# Patient Record
Sex: FEMALE | Race: WHITE | NOT HISPANIC OR LATINO | Employment: OTHER | URBAN - METROPOLITAN AREA
[De-identification: names, ages, dates, MRNs, and addresses within clinical notes are randomized per-mention and may not be internally consistent; named-entity substitution may affect disease eponyms.]

---

## 2019-06-12 ENCOUNTER — TRANSCRIBE ORDERS (OUTPATIENT)
Dept: ADMINISTRATIVE | Facility: HOSPITAL | Age: 79
End: 2019-06-12

## 2019-06-12 DIAGNOSIS — Z12.39 BREAST SCREENING, UNSPECIFIED: Primary | ICD-10-CM

## 2019-06-14 ENCOUNTER — HOSPITAL ENCOUNTER (OUTPATIENT)
Dept: RADIOLOGY | Age: 79
Discharge: HOME | End: 2019-06-14
Payer: MEDICARE

## 2019-06-14 VITALS — BODY MASS INDEX: 20.14 KG/M2 | HEIGHT: 64 IN | WEIGHT: 118 LBS

## 2019-06-14 DIAGNOSIS — Z12.39 BREAST SCREENING, UNSPECIFIED: ICD-10-CM

## 2019-06-14 PROCEDURE — 77067 SCR MAMMO BI INCL CAD: CPT

## 2019-06-25 ENCOUNTER — HOSPITAL ENCOUNTER (OUTPATIENT)
Dept: RADIOLOGY | Age: 79
Discharge: HOME | End: 2019-06-25

## 2019-06-25 DIAGNOSIS — Z12.39 BREAST CANCER SCREENING: ICD-10-CM

## 2020-07-21 ENCOUNTER — HOSPITAL ENCOUNTER (EMERGENCY)
Facility: HOSPITAL | Age: 80
Discharge: HOME | End: 2020-07-21
Attending: EMERGENCY MEDICINE | Admitting: EMERGENCY MEDICINE
Payer: MEDICARE

## 2020-07-21 VITALS
TEMPERATURE: 98.1 F | RESPIRATION RATE: 16 BRPM | BODY MASS INDEX: 19.22 KG/M2 | HEART RATE: 67 BPM | DIASTOLIC BLOOD PRESSURE: 80 MMHG | OXYGEN SATURATION: 99 % | SYSTOLIC BLOOD PRESSURE: 178 MMHG | WEIGHT: 112 LBS
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
## 2020-07-21 PROCEDURE — 99283 EMERGENCY DEPT VISIT LOW MDM: CPT

## 2020-07-21 PROCEDURE — 90471 IMMUNIZATION ADMIN: CPT

## 2020-07-21 PROCEDURE — 99284 EMERGENCY DEPT VISIT MOD MDM: CPT | Performed by: EMERGENCY MEDICINE

## 2020-07-21 PROCEDURE — 90715 TDAP VACCINE 7 YRS/> IM: CPT | Performed by: EMERGENCY MEDICINE

## 2020-07-21 RX ADMIN — TETANUS TOXOID, REDUCED DIPHTHERIA TOXOID AND ACELLULAR PERTUSSIS VACCINE, ADSORBED 0.5 ML: 5; 2.5; 8; 8; 2.5 SUSPENSION INTRAMUSCULAR at 20:41

## 2020-07-22 NOTE — ED NOTES
Registration called stating that the patient was walking out  Nurse made aware       Montrell Adams RN  07/21/20 5357

## 2020-07-22 NOTE — ED ATTENDING ATTESTATION
7/21/2020  IBhanu MD, saw and evaluated the patient  I have discussed the patient with the resident/non-physician practitioner and agree with the resident's/non-physician practitioner's findings, Plan of Care, and MDM as documented in the resident's/non-physician practitioner's note, except where noted  All available labs and Radiology studies were reviewed  I was present for key portions of any procedure(s) performed by the resident/non-physician practitioner and I was immediately available to provide assistance  At this point I agree with the current assessment done in the Emergency Department  I have conducted an independent evaluation of this patient a history and physical is as follows:   Pt fell approx 2 hours pta tripped over something on floor Pt fell hit face no loc Pt got up on own and ambulated no near syncope or cp prior to event  Pt co pain over nose   PE: alert nad heart reg lungs clear abd soft nontender ext nad neuroo nonfocal  Abrasion to bridge of nose no septal hematoma  tms clear  No neck tenderness heart reg lungs clear abd soft nontender neuro nonfocal  MDM: Will do ct head and facial bones   ED Course         Critical Care Time  Procedures

## 2020-07-22 NOTE — ED PROVIDER NOTES
History  Chief Complaint   Patient presents with    Fall     Pt stated that she tripped while walking and hit her nose on the ceramic floor  Denies any blood thinner or LOC  Laceration noted to nose  Bleeding controlled  Worried that she may over broken her nose  HPI     79-year-old female known no prior past medical history presents for evaluation after a fall which occurred approximately two hours prior to arrival in the ED  Patient states she was at home and tripped over something on the floor and hit her face on a table  Patient denies any loss of consciousness  Patient denies any preceding symptoms such as lightheadedness, chest pain, shortness of breath, headache, or palpitations  Patient denies any recent illnesses  At this time, patient is only having pain over her nose  Patient states she was having some bleeding from her nose prior to arrival but it has stopped since then  Denies any headaches or neck pain  Denies any pain in her extremities, chest or abdomen  Denies any weakness, numbness, tingling in her extremities  Denies any visual disturbances, dysphagia, dysarthria, or difficulty with ambulation  Denies any nausea or vomiting  Denies any other symptoms at this time  Patient does not take any antiplatelets or anticoagulants  None       History reviewed  No pertinent past medical history  History reviewed  No pertinent surgical history      Family History   Problem Relation Age of Onset    No Known Problems Mother     No Known Problems Father     No Known Problems Maternal Grandmother     No Known Problems Maternal Grandfather     No Known Problems Paternal Grandmother     Colon cancer Paternal Grandfather 61    Prostate cancer Brother 67    No Known Problems Maternal Aunt     No Known Problems Paternal Aunt     No Known Problems Paternal Aunt     No Known Problems Paternal Aunt     No Known Problems Paternal Aunt      I have reviewed and agree with the history as documented  E-Cigarette/Vaping    E-Cigarette Use Never User      E-Cigarette/Vaping Substances     Social History     Tobacco Use    Smoking status: Never Smoker    Smokeless tobacco: Never Used   Substance Use Topics    Alcohol use: Yes     Frequency: 2-4 times a month     Drinks per session: 1 or 2     Comment: 1 glass of wine a week    Drug use: Not Currently        Review of Systems   Constitutional: Negative for appetite change, chills, fatigue, fever and unexpected weight change  HENT: Positive for nosebleeds  Negative for congestion, facial swelling, rhinorrhea, sore throat and trouble swallowing  Eyes: Negative for visual disturbance  Respiratory: Negative for cough, chest tightness, shortness of breath and wheezing  Cardiovascular: Negative for chest pain, palpitations and leg swelling  Gastrointestinal: Negative for abdominal distention, abdominal pain, constipation, diarrhea, nausea and vomiting  Genitourinary: Negative for dysuria  Musculoskeletal: Negative for arthralgias, back pain, gait problem, myalgias and neck pain  Skin: Negative for color change and rash  Neurological: Negative for dizziness, syncope, speech difficulty, weakness, light-headedness, numbness and headaches  All other systems reviewed and are negative  Physical Exam  ED Triage Vitals [07/21/20 2003]   Temperature Pulse Respirations Blood Pressure SpO2   98 1 °F (36 7 °C) 67 16 (!) 178/80 99 %      Temp Source Heart Rate Source Patient Position - Orthostatic VS BP Location FiO2 (%)   Oral -- -- -- --      Pain Score       1             Orthostatic Vital Signs  Vitals:    07/21/20 2003   BP: (!) 178/80   Pulse: 67       Physical Exam   Constitutional: She is oriented to person, place, and time  She appears well-developed and well-nourished  No distress  HENT:   Head: Normocephalic  Mouth/Throat: Oropharynx is clear and moist  No oropharyngeal exudate  Small abrasion over the nose   No raccoon eyes  Minimal tenderness over the nasal bone  No tenderness around the orbits, maxilla, mandible or the scalp  No septal hematoma  No hemotympanum  Eyes: Pupils are equal, round, and reactive to light  Conjunctivae and EOM are normal  Right eye exhibits no discharge  Left eye exhibits no discharge  Neck: Normal range of motion  Neck supple  Cardiovascular: Normal rate, regular rhythm, normal heart sounds and intact distal pulses  Exam reveals no gallop and no friction rub  No murmur heard  Pulmonary/Chest: Effort normal and breath sounds normal  No respiratory distress  She has no wheezes  She has no rales  Abdominal: Soft  Bowel sounds are normal  She exhibits no distension  There is no tenderness  There is no rebound and no guarding  Musculoskeletal: She exhibits no edema, tenderness or deformity  No midline neck or back pain  Neurological: She is alert and oriented to person, place, and time  She has normal strength  No cranial nerve deficit or sensory deficit  Coordination and gait normal    Skin: Skin is warm and dry  No rash noted  She is not diaphoretic  No erythema  No bruising or abrasions over her arms or legs  Psychiatric: She has a normal mood and affect  Her behavior is normal    Nursing note and vitals reviewed  ED Medications  Medications   tetanus-diphtheria-acellular pertussis (BOOSTRIX) IM injection 0 5 mL (0 5 mL Intramuscular Given 7/21/20 2041)       Diagnostic Studies  Results Reviewed     None                 No orders to display         Procedures  Procedures      ED Course       US AUDIT      Most Recent Value   Initial Alcohol Screen: US AUDIT-C    1  How often do you have a drink containing alcohol?  0 Filed at: 07/21/2020 2019   2  How many drinks containing alcohol do you have on a typical day you are drinking? 0 Filed at: 07/21/2020 2019   3a  Male UNDER 65: How often do you have five or more drinks on one occasion? 0 Filed at: 07/21/2020 2019   3b   FEMALE Any Age, or MALE 65+: How often do you have 4 or more drinks on one occassion? 0 Filed at: 07/21/2020 2019   Audit-C Score  0 Filed at: 07/21/2020 2019              Identification of Seniors at Risk      Most Recent Value   (ISAR) Identification of Seniors at Risk   Before the illness or injury that brought you to the Emergency, did you need someone to help you on a regular basis? 0 Filed at: 07/21/2020 2006   In the last 24 hours, have you needed more help than usual?  0 Filed at: 07/21/2020 2006   Have you been hospitalized for one or more nights during the past 6 months? 0 Filed at: 07/21/2020 2006   In general, do you see well?  0 Filed at: 07/21/2020 2006   In general, do you have serious problems with your memory? 0 Filed at: 07/21/2020 2006   Do you take more than three different medications every day?  0 Filed at: 07/21/2020 2006   ISAR Score  0 Filed at: 07/21/2020 2006          SOO/DAST-10      Most Recent Value   How many times in the past year have you    Used an illegal drug or used a prescription medication for non-medical reasons? Never Filed at: 07/21/2020 2019                              [facility]    80-year-old otherwise healthy female presents for evaluation after a mechanical fall at home  No LOC  Patient has a small abrasion on her nose with mild tenderness to palpation  Neurologic exam is normal, patient is alert and oriented x3  Differential diagnosis includes: nasal fracture, intracranial bleed, contusion/abrasion to nose  Patient does not recall when her last tetanus shot was, will give her tetanus booster  Will also evaluate with noncontrast CT of the head and CT facial bones  Informed by RN that patient left AMA before workup was completed  RN states she gave patient return precautions prior to leaving              Disposition  Final diagnoses:   Fall, initial encounter     Time reflects when diagnosis was documented in both MDM as applicable and the Disposition within this note Time User Action Codes Description Comment    7/21/2020  9:39 PM Melyssa Duckworth Add [N90  XXXA] Fall, initial encounter       ED Disposition     ED Disposition Condition Date/Time Comment    Left from Room after Provider Exam  Tue Jul 21, 2020  9:09 PM       Follow-up Information    None         There are no discharge medications for this patient  No discharge procedures on file  PDMP Review     None           ED Provider  Attending physically available and evaluated Paco Rodriguezlucie KAUFFMAN managed the patient along with the ED Attending      Electronically Signed by         Milind Ford MD  07/21/20 6031

## 2020-07-22 NOTE — ED NOTES
Pt and  eloped from th department at this time  RN urged pt to stay in the department and await CT scan  RN explained the adverse effects of leaving and urged pt to come in for reevaluation if symptoms would worsen or continue  Pt and family verbalized understanding and remain adamant on leaving  Dr Elise Perdomo notified        Dionisio Jackson RN  07/21/20 9609

## 2020-09-14 ENCOUNTER — OFFICE VISIT (OUTPATIENT)
Dept: INTERNAL MEDICINE CLINIC | Facility: CLINIC | Age: 80
End: 2020-09-14
Payer: MEDICARE

## 2020-09-14 VITALS
HEIGHT: 64 IN | TEMPERATURE: 97.3 F | SYSTOLIC BLOOD PRESSURE: 140 MMHG | DIASTOLIC BLOOD PRESSURE: 82 MMHG | BODY MASS INDEX: 18.95 KG/M2 | HEART RATE: 82 BPM | WEIGHT: 111 LBS

## 2020-09-14 DIAGNOSIS — R03.0 WHITE COAT SYNDROME WITH HIGH BLOOD PRESSURE WITHOUT HYPERTENSION: ICD-10-CM

## 2020-09-14 DIAGNOSIS — H01.013 ULCERATIVE BLEPHARITIS OF BOTH EYES, UNSPECIFIED EYELID: ICD-10-CM

## 2020-09-14 DIAGNOSIS — E53.8 VITAMIN B12 DEFICIENCY: ICD-10-CM

## 2020-09-14 DIAGNOSIS — H01.016 ULCERATIVE BLEPHARITIS OF BOTH EYES, UNSPECIFIED EYELID: ICD-10-CM

## 2020-09-14 DIAGNOSIS — T78.40XD ALLERGIC STATE, SUBSEQUENT ENCOUNTER: ICD-10-CM

## 2020-09-14 DIAGNOSIS — Z23 NEED FOR INFLUENZA VACCINATION: ICD-10-CM

## 2020-09-14 DIAGNOSIS — K21.9 GASTROESOPHAGEAL REFLUX DISEASE WITHOUT ESOPHAGITIS: Primary | ICD-10-CM

## 2020-09-14 DIAGNOSIS — E55.9 VITAMIN D DEFICIENCY: ICD-10-CM

## 2020-09-14 PROBLEM — T78.40XA ALLERGIES: Status: ACTIVE | Noted: 2020-09-14

## 2020-09-14 PROCEDURE — 99214 OFFICE O/P EST MOD 30 MIN: CPT | Performed by: INTERNAL MEDICINE

## 2020-09-14 PROCEDURE — 90674 CCIIV4 VAC NO PRSV 0.5 ML IM: CPT

## 2020-09-14 PROCEDURE — G0008 ADMIN INFLUENZA VIRUS VAC: HCPCS

## 2020-09-14 RX ORDER — MELATONIN
2000 DAILY
COMMUNITY

## 2020-09-14 RX ORDER — FAMOTIDINE 10 MG
10 TABLET ORAL 2 TIMES DAILY PRN
COMMUNITY

## 2020-09-14 NOTE — PROGRESS NOTES
Assessment/Plan:           1  Gastroesophageal reflux disease without esophagitis    2  Vitamin D deficiency    3  Vitamin B12 deficiency    4  Allergic state, subsequent encounter    5  Ulcerative blepharitis of both eyes, unspecified eyelid    6  White coat syndrome with high blood pressure without hypertension           1  Gastroesophageal reflux disease without esophagitis      2  Vitamin D deficiency      3  Vitamin B12 deficiency      4  Allergic state, subsequent encounter      5  Ulcerative blepharitis of both eyes, unspecified eyelid      6  White coat syndrome with high blood pressure without hypertension             Subjective:      Patient ID: Maico Long is a [de-identified] y o  female  HPI    The following portions of the patient's history were reviewed and updated as appropriate: She  has a past medical history of Gastroesophageal reflux disease, Hypertension, essential, Other abnormal and inconclusive findings on diagnostic imaging of breast, Other rosacea, Palmar fascial fibromatosis, and Vitamin D deficiency  She   Patient Active Problem List    Diagnosis Date Noted    Gastroesophageal reflux disease without esophagitis 09/14/2020    Vitamin D deficiency 09/14/2020    Vitamin B12 deficiency 09/14/2020    Allergies 09/14/2020     She  has a past surgical history that includes Tubal ligation  Her family history includes Colon cancer (age of onset: 61) in her paternal grandfather; No Known Problems in her father, maternal aunt, maternal grandfather, maternal grandmother, mother, paternal aunt, paternal aunt, paternal aunt, paternal aunt, and paternal grandmother; Prostate cancer (age of onset: 67) in her brother  She  reports that she has never smoked  She has never used smokeless tobacco  She reports current alcohol use  She reports previous drug use    Current Outpatient Medications   Medication Sig Dispense Refill    cholecalciferol (VITAMIN D3) 1,000 units tablet Take 2,000 Units by mouth daily  famotidine (PEPCID) 10 mg tablet Take 10 mg by mouth 2 (two) times a day as needed      vitamin B-12 (CYANOCOBALAMIN) 250 MCG tablet Take 250 mcg by mouth daily       No current facility-administered medications for this visit  Current Outpatient Medications on File Prior to Visit   Medication Sig    cholecalciferol (VITAMIN D3) 1,000 units tablet Take 2,000 Units by mouth daily    famotidine (PEPCID) 10 mg tablet Take 10 mg by mouth 2 (two) times a day as needed    vitamin B-12 (CYANOCOBALAMIN) 250 MCG tablet Take 250 mcg by mouth daily     No current facility-administered medications on file prior to visit  She is allergic to aspirin; bactrim [sulfamethoxazole-trimethoprim]; and proton pump inhibitors       Review of Systems   Constitutional: Negative for appetite change, chills, fatigue and fever  HENT: Negative for sore throat and trouble swallowing  Eyes: Positive for redness  Respiratory: Negative for shortness of breath  Cardiovascular: Negative for chest pain and palpitations  Gastrointestinal: Negative for abdominal pain, constipation and diarrhea  Genitourinary: Negative for dysuria and hematuria  Musculoskeletal: Negative for back pain and neck pain  Skin: Negative for rash  Neurological: Negative for seizures, weakness and headaches  Hematological: Negative for adenopathy  Psychiatric/Behavioral: Negative for confusion  The patient is not nervous/anxious  Objective:      /82 (BP Location: Right leg, Patient Position: Sitting, Cuff Size: Standard)   Pulse 82   Temp (!) 97 3 °F (36 3 °C)   Ht 5' 4 25" (1 632 m)   Wt 50 3 kg (111 lb)   BMI 18 91 kg/m²     No results found for this or any previous visit (from the past 1344 hour(s))  Physical Exam  Constitutional:       General: She is not in acute distress  Appearance: Normal appearance  HENT:      Head: Normocephalic and atraumatic        Nose: Nose normal       Mouth/Throat: Mouth: Mucous membranes are moist    Eyes:      Extraocular Movements: Extraocular movements intact  Pupils: Pupils are equal, round, and reactive to light  Comments: Erythema of eyelids   Cardiovascular:      Rate and Rhythm: Normal rate and regular rhythm  Pulses: Normal pulses  Heart sounds: Normal heart sounds  No murmur  No friction rub  Pulmonary:      Effort: Pulmonary effort is normal  No respiratory distress  Breath sounds: Normal breath sounds  No wheezing  Abdominal:      General: Abdomen is flat  Bowel sounds are normal  There is no distension  Palpations: Abdomen is soft  There is no mass  Tenderness: There is no abdominal tenderness  There is no guarding  Musculoskeletal: Normal range of motion  Neurological:      General: No focal deficit present  Mental Status: She is alert and oriented to person, place, and time  Mental status is at baseline  Cranial Nerves: No cranial nerve deficit     Psychiatric:         Mood and Affect: Mood normal          Behavior: Behavior normal

## 2020-11-16 ENCOUNTER — OFFICE VISIT (OUTPATIENT)
Dept: INTERNAL MEDICINE CLINIC | Facility: CLINIC | Age: 80
End: 2020-11-16
Payer: MEDICARE

## 2020-11-16 VITALS
DIASTOLIC BLOOD PRESSURE: 100 MMHG | WEIGHT: 112 LBS | HEIGHT: 64 IN | OXYGEN SATURATION: 95 % | BODY MASS INDEX: 19.12 KG/M2 | TEMPERATURE: 97.3 F | HEART RATE: 83 BPM | SYSTOLIC BLOOD PRESSURE: 160 MMHG

## 2020-11-16 DIAGNOSIS — Z00.00 MEDICARE ANNUAL WELLNESS VISIT, SUBSEQUENT: Primary | ICD-10-CM

## 2020-11-16 DIAGNOSIS — H61.22 CERUMEN DEBRIS ON TYMPANIC MEMBRANE OF LEFT EAR: ICD-10-CM

## 2020-11-16 DIAGNOSIS — R03.0 WHITE COAT SYNDROME WITHOUT DIAGNOSIS OF HYPERTENSION: ICD-10-CM

## 2020-11-16 DIAGNOSIS — K21.9 GASTROESOPHAGEAL REFLUX DISEASE WITHOUT ESOPHAGITIS: ICD-10-CM

## 2020-11-16 DIAGNOSIS — E53.8 VITAMIN B12 DEFICIENCY: ICD-10-CM

## 2020-11-16 DIAGNOSIS — E55.9 VITAMIN D DEFICIENCY, UNSPECIFIED: ICD-10-CM

## 2020-11-16 PROCEDURE — 99214 OFFICE O/P EST MOD 30 MIN: CPT | Performed by: INTERNAL MEDICINE

## 2020-11-16 PROCEDURE — G0439 PPPS, SUBSEQ VISIT: HCPCS | Performed by: INTERNAL MEDICINE

## 2020-11-16 RX ORDER — ASPIRIN 81 MG
5 TABLET, DELAYED RELEASE (ENTERIC COATED) ORAL
Qty: 15 ML | Refills: 0
Start: 2020-11-16 | End: 2022-03-08

## 2021-01-26 ENCOUNTER — IMMUNIZATIONS (OUTPATIENT)
Dept: FAMILY MEDICINE CLINIC | Facility: HOSPITAL | Age: 81
End: 2021-01-26

## 2021-01-26 DIAGNOSIS — Z23 ENCOUNTER FOR IMMUNIZATION: Primary | ICD-10-CM

## 2021-01-26 PROCEDURE — 91300 SARS-COV-2 / COVID-19 MRNA VACCINE (PFIZER-BIONTECH) 30 MCG: CPT

## 2021-01-26 PROCEDURE — 0001A SARS-COV-2 / COVID-19 MRNA VACCINE (PFIZER-BIONTECH) 30 MCG: CPT

## 2021-02-14 ENCOUNTER — IMMUNIZATIONS (OUTPATIENT)
Dept: FAMILY MEDICINE CLINIC | Facility: HOSPITAL | Age: 81
End: 2021-02-14

## 2021-02-14 DIAGNOSIS — Z23 ENCOUNTER FOR IMMUNIZATION: Primary | ICD-10-CM

## 2021-02-14 PROCEDURE — 91300 SARS-COV-2 / COVID-19 MRNA VACCINE (PFIZER-BIONTECH) 30 MCG: CPT

## 2021-02-14 PROCEDURE — 0002A SARS-COV-2 / COVID-19 MRNA VACCINE (PFIZER-BIONTECH) 30 MCG: CPT

## 2021-08-05 ENCOUNTER — TELEPHONE (OUTPATIENT)
Dept: INTERNAL MEDICINE CLINIC | Facility: CLINIC | Age: 81
End: 2021-08-05

## 2021-08-05 NOTE — TELEPHONE ENCOUNTER
Called pt back to let them you would discuss with them next appt  He said he was thinking about going to [place] or [place] for tests to look at her memory, he said he is concerned because his son said it is very noticeable that Onetha June  is getting bad

## 2021-08-05 NOTE — TELEPHONE ENCOUNTER
Patients  called saying that the person you referred them to for memory, referred them to [facility], and they can't get her in til January and they don't want to wait that long  He wants to know what they should do

## 2021-08-09 ENCOUNTER — OFFICE VISIT (OUTPATIENT)
Dept: INTERNAL MEDICINE CLINIC | Facility: CLINIC | Age: 81
End: 2021-08-09
Payer: MEDICARE

## 2021-08-09 VITALS
WEIGHT: 102 LBS | TEMPERATURE: 98.4 F | HEART RATE: 68 BPM | SYSTOLIC BLOOD PRESSURE: 128 MMHG | HEIGHT: 64 IN | BODY MASS INDEX: 17.42 KG/M2 | DIASTOLIC BLOOD PRESSURE: 70 MMHG | OXYGEN SATURATION: 98 %

## 2021-08-09 DIAGNOSIS — H01.013 ULCERATIVE BLEPHARITIS OF BOTH EYES, UNSPECIFIED EYELID: ICD-10-CM

## 2021-08-09 DIAGNOSIS — L30.9 DERMATITIS: ICD-10-CM

## 2021-08-09 DIAGNOSIS — H01.016 ULCERATIVE BLEPHARITIS OF BOTH EYES, UNSPECIFIED EYELID: ICD-10-CM

## 2021-08-09 DIAGNOSIS — E55.9 VITAMIN D DEFICIENCY, UNSPECIFIED: ICD-10-CM

## 2021-08-09 DIAGNOSIS — G30.1 LATE ONSET ALZHEIMER'S DEMENTIA WITHOUT BEHAVIORAL DISTURBANCE (HCC): Primary | ICD-10-CM

## 2021-08-09 DIAGNOSIS — F02.80 LATE ONSET ALZHEIMER'S DEMENTIA WITHOUT BEHAVIORAL DISTURBANCE (HCC): Primary | ICD-10-CM

## 2021-08-09 DIAGNOSIS — K21.9 GASTROESOPHAGEAL REFLUX DISEASE WITHOUT ESOPHAGITIS: ICD-10-CM

## 2021-08-09 DIAGNOSIS — E53.8 VITAMIN B12 DEFICIENCY: ICD-10-CM

## 2021-08-09 PROCEDURE — 99215 OFFICE O/P EST HI 40 MIN: CPT | Performed by: INTERNAL MEDICINE

## 2021-08-09 NOTE — PROGRESS NOTES
Assessment/Plan: This is a 80-year-old lady who has a decline in her cognitive function  Her daughter-in-law is a clinical psychologist in [place] and has noticed the decline  She has also been suffering from ulcerative blepharitis and she is following with Ophthalmology  A Karlos cognitive assessment test was performed and she did poorly  Part of it was from anxiety and also not bringing her regular reading glasses to the examination  1  Late onset Alzheimer's dementia without behavioral disturbance [facility])  Comments:  Visit with Neurology was strongly recommended  Orders:  -     CBC and differential; Future  -     Comprehensive metabolic panel; Future  -     UA (URINE) with reflex to Scope  -     TSH, 3rd generation; Future  -     Ambulatory referral to Neurology; Future  -     Vitamin B12; Future  -     Methylmalonic acid, serum; Future  -     RPR; Future  -     Lyme Antibody Profile with reflex to WB; Future  -     Heavy metals screen, urine  -     Ambulatory referral to Neurology; Future    2  Gastroesophageal reflux disease without esophagitis  Comments:  Symptoms are stable on famotidine  3  Vitamin D deficiency, unspecified    4  Vitamin B12 deficiency    5  Ulcerative blepharitis of both eyes, unspecified eyelid    6  Dermatitis  Comments:  She will follow-up with dermatology  Orders:  -     Ambulatory referral to Dermatology; Future           1  Gastroesophageal reflux disease without esophagitis      2  Vitamin D deficiency, unspecified      3  Vitamin B12 deficiency      4  Ulcerative blepharitis of both eyes, unspecified eyelid             Subjective:      Patient ID: Estrellita Bartlett is a 80 y o  female  This is a 80-year-old lady who has a decline in her cognitive function  Her daughter-in-law is a clinical psychologist in [place] and has noticed the decline  She has also been suffering from ulcerative blepharitis and she is following with Ophthalmology        The following portions of the patient's history were reviewed and updated as appropriate: She  has a past medical history of Esophageal dilatation, Gastroesophageal reflux disease, Hypertension, essential, Other abnormal and inconclusive findings on diagnostic imaging of breast, Other rosacea, Palmar fascial fibromatosis, and Vitamin D deficiency  She   Patient Active Problem List    Diagnosis Date Noted    Gastroesophageal reflux disease without esophagitis 09/14/2020    Vitamin D deficiency 09/14/2020    Vitamin B12 deficiency 09/14/2020    Allergies 09/14/2020     She  has a past surgical history that includes Tubal ligation (1976) and Colonoscopy (12/21/2011)  Her family history includes Colon cancer (age of onset: 61) in her paternal grandfather; Hypertension in her family; No Known Problems in her maternal aunt, maternal grandfather, maternal grandmother, paternal aunt, paternal aunt, paternal aunt, paternal aunt, and paternal grandmother; Prostate cancer in her family; Prostate cancer (age of onset: 67) in her brother; Stroke in her father and mother  She  reports that she has never smoked  She has never used smokeless tobacco  She reports current alcohol use  She reports previous drug use  Current Outpatient Medications   Medication Sig Dispense Refill    cholecalciferol (VITAMIN D3) 1,000 units tablet Take 2,000 Units by mouth daily      famotidine (PEPCID) 10 mg tablet Take 10 mg by mouth 2 (two) times a day as needed      vitamin B-12 (CYANOCOBALAMIN) 250 MCG tablet Take 250 mcg by mouth daily      carbamide peroxide (Debrox) 6 5 % otic solution Administer 5 drops into the left ear daily at bedtime for 10 days 15 mL 0     No current facility-administered medications for this visit       Current Outpatient Medications on File Prior to Visit   Medication Sig    cholecalciferol (VITAMIN D3) 1,000 units tablet Take 2,000 Units by mouth daily    famotidine (PEPCID) 10 mg tablet Take 10 mg by mouth 2 (two) times a day as needed    vitamin B-12 (CYANOCOBALAMIN) 250 MCG tablet Take 250 mcg by mouth daily    carbamide peroxide (Debrox) 6 5 % otic solution Administer 5 drops into the left ear daily at bedtime for 10 days     No current facility-administered medications on file prior to visit  She is allergic to dust mite extract, aspirin, bactrim [sulfamethoxazole-trimethoprim], pollen extract, and proton pump inhibitors       Review of Systems   Constitutional: Negative for appetite change, chills, fatigue and fever  HENT: Negative for sore throat and trouble swallowing  Eyes: Negative for redness  Respiratory: Negative for shortness of breath  Cardiovascular: Negative for chest pain and palpitations  Gastrointestinal: Negative for abdominal pain, constipation and diarrhea  Genitourinary: Negative for dysuria and hematuria  Musculoskeletal: Negative for back pain and neck pain  Skin: Negative for rash  Neurological: Negative for seizures, weakness and headaches  Hematological: Negative for adenopathy  Psychiatric/Behavioral: Negative for confusion  The patient is not nervous/anxious  Objective:      /70   Pulse 68   Temp 98 4 °F (36 9 °C)   Ht 5' 4 25" (1 632 m)   Wt 46 3 kg (102 lb)   SpO2 98%   BMI 17 37 kg/m²     No results found for this or any previous visit (from the past 1344 hour(s))  Physical Exam  Constitutional:       General: She is not in acute distress  Appearance: Normal appearance  HENT:      Head: Normocephalic and atraumatic  Nose: Nose normal       Mouth/Throat:      Mouth: Mucous membranes are moist    Eyes:      Extraocular Movements: Extraocular movements intact  Pupils: Pupils are equal, round, and reactive to light  Cardiovascular:      Rate and Rhythm: Normal rate and regular rhythm  Pulses: Normal pulses  Heart sounds: Normal heart sounds  No murmur heard  No friction rub     Pulmonary:      Effort: Pulmonary effort is normal  No respiratory distress  Breath sounds: Normal breath sounds  No wheezing  Abdominal:      General: Abdomen is flat  Bowel sounds are normal  There is no distension  Palpations: Abdomen is soft  There is no mass  Tenderness: There is no abdominal tenderness  There is no guarding  Musculoskeletal:         General: Normal range of motion  Cervical back: Normal range of motion  Skin:     Findings: Rash present  Neurological:      General: No focal deficit present  Mental Status: She is alert and oriented to person, place, and time  Mental status is at baseline  Cranial Nerves: No cranial nerve deficit  Psychiatric:         Mood and Affect: Mood normal          Behavior: Behavior normal        BMI Counseling: Body mass index is 17 37 kg/m²  The BMI is below normal  Patient was advised to gain weight

## 2021-08-11 ENCOUNTER — TELEPHONE (OUTPATIENT)
Dept: GERIATRICS | Age: 81
End: 2021-08-11

## 2021-08-11 NOTE — TELEPHONE ENCOUNTER
[address] [place] [address]  [phone]    Telephone Intake: Geriatric Assessment     Referral source: Dr Neha Aj (PCP)                                    Caller who is scheduling/relationship to patient: Yves Lindsay, spouse  Caller's phone number: [phone]              Is there a Power of  in place/If so, who? No    Reason for referral: Family member concerns and Provider concerns regarding memory concerns, word recall  What is the goal of visit? Hope there is some type of treatment/medical intervention that would be helpful     Has the patient been seen by a Neurologist or Geriatrician? No  Has the patient ever been diagnosed with dementia? No      Preferred language? English  Highest education level? Masters  Does the patient wear glasses? Yes (only for reading)  Does the patient use hearing aids? No     Does the patient and/or caregiver have access for a virtual visit (computer or smart phone, working audio and video)? Yes     Caller was informed:    Please make sure the patient is accompanied by someone who knows them well / a caregiver / family member to participate in this appointment  If a patient plans to attend the assessment alone, please route a message to the assigned provider   Primary number on file will be called to confirm this appointment  Who will accompany the patient? spouse    Office packet mailed out to: Anthony Corcoran [address] [place] [id number]    NOTE FOR : If the patient was recently hospitalized, please route intake to assigned provider for chart review

## 2021-08-11 NOTE — LETTER
August 11, 2021    Dear Humberto Bill,    Thank you for choosing [facility]  We assist in the needs of older adults and their families  Our goal is to help aging adults in maintaining a healthy, safe and independent lifestyle and to work with their primary care physician to coordinate care  There are many reasons appointments are scheduled with us  Some patients are seeking a baseline assessment as part of medical history; other patients might have a specific concern regarding cognition, multiple medications, or overall health concerns  What to Expect  Two, possibly, three visits:     Initial Visit is approximately one hour  The patient and representative/family meet with the doctor, possibly a resident/fellow, and the   In addition to medical issues, patient goals, quality of life, home safety, and greatest areas of concern are addressed  If necessary, additional testing may be ordered such as bloodwork, imaging, and/or a computerized cognitive evaluation  A physical therapy/occupation therapy evaluation may also be ordered  Conference visit is approximately one hour  The patient and representative/family review an individualized care plan targeting patient's goals/concerns/issues and are provided with recommendations and resources  [place] [address] is a teaching institution and there will be residents and fellows as a part of your visits with us       Please bring the following to your initial appointment:      A mask without a valve (to be worn by everyone entering the building)    Eye glasses and/or hearing aids (as applicable)    Enclosed forms (please complete and bring to appointment)    Advance directives (a living will and/or durable power of ) if established    Medication list (including vitamins or supplements you are currently taking)       DATE of INITIAL VISIT: Monday, December 13, 2021 at 10AM    Please arrive at least 15 minutes before scheduled appointment time  When you park, please call our office at [phone] to let us know you have arrived  We will conduct check-in by telephone  One person may accompany you to your appointment  DATE of CARE CONFERENCE: Monday, January 17, 2022 at [address]: [address] [facility] office will call 30-45 minutes prior to appointment complete check in, review medications and allergies, as well as ask for vitals  If you can take the patient's blood pressure, pulse, temperature, and weight on the day of this appointment, it would be appreciated  For In-office Conference: One person may accompany the patient to appointment  Others are welcome to join by phone or video  Please let us know which you prefer so we can provide a conference phone number or a video link  PLEASE NOTE: Due to the extensive and comprehensive nature of the visit, if you are more than 15 minutes late we will need to reschedule the appointment  Thank you again for choosing [address] [place] [address]  We look forward to meeting you!

## 2021-08-15 LAB
ALBUMIN SERPL-MCNC: 4.2 G/DL (ref 3.6–5.1)
ALBUMIN/GLOB SERPL: 1.6 (CALC) (ref 1–2.5)
ALP SERPL-CCNC: 77 U/L (ref 37–153)
ALT SERPL-CCNC: 27 U/L (ref 6–29)
APPEARANCE UR: CLEAR
ARSENIC/CREAT UR: NORMAL MCG/G CREAT
AST SERPL-CCNC: 28 U/L (ref 10–35)
B BURGDOR AB SER QL IA: 0.96 INDEX
B BURGDOR IGG SER QL IB: NEGATIVE
B BURGDOR IGM SER QL IB: NEGATIVE
B BURGDOR18KD IGG SER QL IB: ABNORMAL
B BURGDOR23KD IGG SER QL IB: ABNORMAL
B BURGDOR23KD IGM SER QL IB: REACTIVE
B BURGDOR28KD IGG SER QL IB: ABNORMAL
B BURGDOR30KD IGG SER QL IB: ABNORMAL
B BURGDOR39KD IGG SER QL IB: ABNORMAL
B BURGDOR39KD IGM SER QL IB: ABNORMAL
B BURGDOR41KD IGG SER QL IB: ABNORMAL
B BURGDOR41KD IGM SER QL IB: ABNORMAL
B BURGDOR45KD IGG SER QL IB: ABNORMAL
B BURGDOR58KD IGG SER QL IB: ABNORMAL
B BURGDOR66KD IGG SER QL IB: ABNORMAL
B BURGDOR93KD IGG SER QL IB: REACTIVE
BACTERIA UR QL AUTO: ABNORMAL /HPF
BASOPHILS # BLD AUTO: 44 CELLS/UL (ref 0–200)
BASOPHILS NFR BLD AUTO: 0.6 %
BILIRUB SERPL-MCNC: 0.6 MG/DL (ref 0.2–1.2)
BILIRUB UR QL STRIP: NEGATIVE
BUN SERPL-MCNC: 19 MG/DL (ref 7–25)
BUN/CREAT SERPL: NORMAL (CALC) (ref 6–22)
CALCIUM SERPL-MCNC: 9.5 MG/DL (ref 8.6–10.4)
CHLORIDE SERPL-SCNC: 102 MMOL/L (ref 98–110)
CO2 SERPL-SCNC: 31 MMOL/L (ref 20–32)
COLOR UR: YELLOW
CREAT SERPL-MCNC: 0.61 MG/DL (ref 0.6–0.88)
CREAT UR-MCNC: 56 MG/DL (ref 20–275)
EOSINOPHIL # BLD AUTO: 241 CELLS/UL (ref 15–500)
EOSINOPHIL NFR BLD AUTO: 3.3 %
ERYTHROCYTE [DISTWIDTH] IN BLOOD BY AUTOMATED COUNT: 11.9 % (ref 11–15)
GLOBULIN SER CALC-MCNC: 2.7 G/DL (CALC) (ref 1.9–3.7)
GLUCOSE SERPL-MCNC: 91 MG/DL (ref 65–99)
GLUCOSE UR QL STRIP: NEGATIVE
HCT VFR BLD AUTO: 41.9 % (ref 35–45)
HGB BLD-MCNC: 13.4 G/DL (ref 11.7–15.5)
HGB UR QL STRIP: ABNORMAL
HYALINE CASTS #/AREA URNS LPF: ABNORMAL /LPF
KETONES UR QL STRIP: NEGATIVE
LEAD/CREAT UR: NORMAL MCG/G CREAT
LEUKOCYTE ESTERASE UR QL STRIP: ABNORMAL
LYMPHOCYTES # BLD AUTO: 2000 CELLS/UL (ref 850–3900)
LYMPHOCYTES NFR BLD AUTO: 27.4 %
MCH RBC QN AUTO: 29.3 PG (ref 27–33)
MCHC RBC AUTO-ENTMCNC: 32 G/DL (ref 32–36)
MCV RBC AUTO: 91.5 FL (ref 80–100)
MERCURY/CREAT UR: NORMAL MCG/G CREAT
METHYLMALONATE SERPL-SCNC: 99 NMOL/L (ref 87–318)
MONOCYTES # BLD AUTO: 876 CELLS/UL (ref 200–950)
MONOCYTES NFR BLD AUTO: 12 %
NEUTROPHILS # BLD AUTO: 4139 CELLS/UL (ref 1500–7800)
NEUTROPHILS NFR BLD AUTO: 56.7 %
NITRITE UR QL STRIP: NEGATIVE
PH UR STRIP: 7 [PH] (ref 5–8)
PLATELET # BLD AUTO: 185 THOUSAND/UL (ref 140–400)
PMV BLD REES-ECKER: 11.9 FL (ref 7.5–12.5)
POTASSIUM SERPL-SCNC: 3.9 MMOL/L (ref 3.5–5.3)
PROT SERPL-MCNC: 6.9 G/DL (ref 6.1–8.1)
PROT UR QL STRIP: NEGATIVE
RBC # BLD AUTO: 4.58 MILLION/UL (ref 3.8–5.1)
RBC #/AREA URNS HPF: ABNORMAL /HPF
RPR SER QL: NORMAL
SL AMB EGFR AFRICAN AMERICAN: 99 ML/MIN/1.73M2
SL AMB EGFR NON AFRICAN AMERICAN: 85 ML/MIN/1.73M2
SODIUM SERPL-SCNC: 139 MMOL/L (ref 135–146)
SP GR UR STRIP: 1.01 (ref 1–1.03)
SQUAMOUS #/AREA URNS HPF: ABNORMAL /HPF
TSH SERPL-ACNC: 1.89 MIU/L (ref 0.4–4.5)
VIT B12 SERPL-MCNC: 1304 PG/ML (ref 200–1100)
WBC # BLD AUTO: 7.3 THOUSAND/UL (ref 3.8–10.8)
WBC #/AREA URNS HPF: ABNORMAL /HPF

## 2021-08-17 ENCOUNTER — TELEPHONE (OUTPATIENT)
Dept: GERIATRICS | Age: 81
End: 2021-08-17

## 2021-08-17 NOTE — TELEPHONE ENCOUNTER
Calling from waitlist to offer 8/18/2021 at 2 pm   Parker Pedraza answered the phone, said he was busy and I interrupted please call back later

## 2021-08-18 NOTE — TELEPHONE ENCOUNTER
Left voicemail for spouse to call office; offered sooner appointment as patient is on wait list   Intend to offer 8/18 at [facility] and then schedule care conference sooner

## 2021-10-14 ENCOUNTER — TELEPHONE (OUTPATIENT)
Dept: GERIATRICS | Age: 81
End: 2021-10-14

## 2021-11-01 ENCOUNTER — TELEPHONE (OUTPATIENT)
Dept: GERIATRICS | Age: 81
End: 2021-11-01

## 2021-11-23 ENCOUNTER — CONSULT (OUTPATIENT)
Dept: GERIATRICS | Age: 81
End: 2021-11-23
Payer: MEDICARE

## 2021-11-23 VITALS
RESPIRATION RATE: 16 BRPM | HEART RATE: 70 BPM | SYSTOLIC BLOOD PRESSURE: 138 MMHG | WEIGHT: 101 LBS | DIASTOLIC BLOOD PRESSURE: 76 MMHG | HEIGHT: 64 IN | TEMPERATURE: 97.5 F | OXYGEN SATURATION: 96 % | BODY MASS INDEX: 17.24 KG/M2

## 2021-11-23 DIAGNOSIS — K21.9 GASTROESOPHAGEAL REFLUX DISEASE WITHOUT ESOPHAGITIS: ICD-10-CM

## 2021-11-23 DIAGNOSIS — E55.9 VITAMIN D DEFICIENCY: ICD-10-CM

## 2021-11-23 DIAGNOSIS — F03.90 DEMENTIA WITHOUT BEHAVIORAL DISTURBANCE, UNSPECIFIED DEMENTIA TYPE (HCC): Primary | ICD-10-CM

## 2021-11-23 PROCEDURE — 99205 OFFICE O/P NEW HI 60 MIN: CPT | Performed by: STUDENT IN AN ORGANIZED HEALTH CARE EDUCATION/TRAINING PROGRAM

## 2021-11-24 ENCOUNTER — TELEPHONE (OUTPATIENT)
Dept: GERIATRICS | Age: 81
End: 2021-11-24

## 2021-11-24 DIAGNOSIS — F03.90 DEMENTIA WITHOUT BEHAVIORAL DISTURBANCE, UNSPECIFIED DEMENTIA TYPE (HCC): ICD-10-CM

## 2021-11-24 RX ORDER — LORAZEPAM 0.5 MG/1
0.25 TABLET ORAL ONCE
Qty: 1 TABLET | Refills: 0 | Status: SHIPPED | OUTPATIENT
Start: 2021-11-24 | End: 2022-03-08

## 2021-12-09 ENCOUNTER — TELEPHONE (OUTPATIENT)
Dept: GERIATRICS | Age: 81
End: 2021-12-09

## 2021-12-14 ENCOUNTER — TELEPHONE (OUTPATIENT)
Dept: GERIATRICS | Age: 81
End: 2021-12-14

## 2021-12-28 ENCOUNTER — HOSPITAL ENCOUNTER (OUTPATIENT)
Dept: RADIOLOGY | Facility: HOSPITAL | Age: 81
Discharge: HOME | End: 2021-12-28
Attending: STUDENT IN AN ORGANIZED HEALTH CARE EDUCATION/TRAINING PROGRAM
Payer: MEDICARE

## 2021-12-28 DIAGNOSIS — F03.90 DEMENTIA WITHOUT BEHAVIORAL DISTURBANCE, UNSPECIFIED DEMENTIA TYPE (HCC): ICD-10-CM

## 2021-12-28 PROCEDURE — 70551 MRI BRAIN STEM W/O DYE: CPT

## 2021-12-28 PROCEDURE — G1004 CDSM NDSC: HCPCS

## 2022-01-04 ENCOUNTER — TELEPHONE (OUTPATIENT)
Dept: GERIATRICS | Age: 82
End: 2022-01-04

## 2022-01-04 NOTE — TELEPHONE ENCOUNTER
Patient was here for mindstream testing but after 2 hours of attempting to complete the mindstream patient was instructed by Gerhard Barrera  as per Dr Jacob Garland to stop test

## 2022-01-17 ENCOUNTER — TELEPHONE (OUTPATIENT)
Dept: GERIATRICS | Age: 82
End: 2022-01-17

## 2022-01-17 ENCOUNTER — OFFICE VISIT (OUTPATIENT)
Dept: GERIATRICS | Age: 82
End: 2022-01-17
Payer: MEDICARE

## 2022-01-17 VITALS
HEART RATE: 73 BPM | BODY MASS INDEX: 17.14 KG/M2 | TEMPERATURE: 97.6 F | WEIGHT: 100.4 LBS | OXYGEN SATURATION: 97 % | SYSTOLIC BLOOD PRESSURE: 140 MMHG | DIASTOLIC BLOOD PRESSURE: 72 MMHG | RESPIRATION RATE: 18 BRPM | HEIGHT: 64 IN

## 2022-01-17 DIAGNOSIS — F03.90 DEMENTIA WITHOUT BEHAVIORAL DISTURBANCE, UNSPECIFIED DEMENTIA TYPE (HCC): Primary | ICD-10-CM

## 2022-01-17 DIAGNOSIS — K21.9 GASTROESOPHAGEAL REFLUX DISEASE WITHOUT ESOPHAGITIS: ICD-10-CM

## 2022-01-17 DIAGNOSIS — E55.9 VITAMIN D DEFICIENCY: ICD-10-CM

## 2022-01-17 PROCEDURE — 99215 OFFICE O/P EST HI 40 MIN: CPT | Performed by: STUDENT IN AN ORGANIZED HEALTH CARE EDUCATION/TRAINING PROGRAM

## 2022-01-17 NOTE — PROGRESS NOTES
[facility]jit [facility]  [address], [address] [facility], [address] [facility]  [phone]    Care Conference  This note was not shared with the patient due to privacy exception: note includes other individuals    NAME: Sanjiv Ferro  AGE: 80 y o  SEX: female  YOB: 1940  DATE OF ASSESSMENT: 11/23/2021  DATE OF CONFERENCE: 1/17/2022  Family Present: Darling Kaylynmarco (spouse)  Staff Present: Maria Esther Crespo MD, Kalee Mcelroy    Patient / Family Goals of Care: Review cognitive decline and associated symptoms, discuss treatment options and care planning  Medical Concerns (Current/Historical):  Gastroesophageal reflux disease without esophagitis, vitamin-D deficiency    Geriatric Syndromes/Age Related Syndromes:  Dementia    Neuropsychological   Dementia, mild to moderate, etiology may be possibly due to a vascular component given chronic microvascular ischemic microangiopathy noted on MRI  o [place] Cognitive Assessment: 13/30  o [place] Screening for Depression and Dementia: 5  o JOSEF 7 negative  o NeuroTrax:  Patient was unable to complete the test despite attempting     Remain active physically, mentally and socially  [employer] and non-pharmaceutical interventions discussed   Recommend Mediterranean diet, shown to decrease risk of memory loss and CVA   Engage in cognitively challenging exercises as able   Referral to speech therapy for cognitive rehabilitation   Patient no longer drives as a result of which this is not a concern   Encourage enrolling into a senior center positive socialization and physical activity   Consider attending morning star memory cafe   This patient meets criteria for placement into a higher level of care such as an assisted living facility  Goal as previously expressed by  is to keep the patient at home     Imperative to follow with PCP closely and manage vascular risk factors given elevated BP during today's visit   Maintain chronic conditions under control   Repeat cognitive assessment as needed     Social / Safety Considerations  · Consider an Adult Day Program or [employer] for positive socialization, physical exercise, cognitive stimulation*   Continue attending weekly social event with friends   Recommend review of fall risk prevention tips ([address])  o Recommend a fall alert device  o Recommend night lights rather than use of flashlight for safety reasons   Consider use of fall alert with GPS, SafeReturn bracelet or Project LifeSaver bracelet   Strongly recommend individual packaging for Ms  Gelacio's medications; consider assistance for medication administration such as blister packaging or use of an automated pill dispenser   Recommend contacting your local [address] on Aging for possible eligible programs such as OPTIONS, Caregiver Support Program, or [facility] Maintain updated advance directives and provide a copy to your primary care provider (not currently on file with Audelia Wayne)   Consider caregiver support groups and educational resources through the Alzheimer's Association; access Alzheimer's Association 24/7 Helpline at [address] reorientation and redirection as needed (dependent on situation)  [employer] information provided   Recommended literature: 36 Hour Day    Diagnostic Studies   Review of bloodwork (recently completed TSH, B12, RPR)   Review of MRI NeuroQuant (12/28/2021), impression:  o No mass effect, acute intracranial hemorrhage or evidence of recent infarction  Moderate chronic microvascular ischemic change   o NeuroQuant analysis was performed: Normal study; Does not support neurodegeneration       Physical Finding Impacting Function   Timed Up and Go Test:  11 seconds (Fall risk assessment: low)   Activities of Daily Living: Independent   Instrumental Activities of Daily Living: Somewhat Independent     Encourage appropriate footwear at all times   Review fall risk prevention tips and adjust within the home environment as needed    Medications Reviewed    Check with PCP before using over the counter medications   Avoid over the counter medications that can affect cognition (e g , Benadryl, Tylenol PM)    Avoid NSAIDs due to risk of GI bleed and renal impairment    Other Findings   BMI: 17 47 kg/m²   Maintain well-balanced diet   Continue following with primary care physician regularly  Gastroesophageal reflux disease without esophagitis   Continue anti-reflux measures; continue Pepcid twice daily  Vitamin D deficiency   Continue vitamin D supplementation  Follow up with PCP for continued monitoring   Elevated BP in office  Follow-up with PCP for management of vascular risk factors      Recommended Health Maintenance   Immunizations, if not contraindicated:    Influenza vaccine yearly   Pneumo vaccine every 5 years (65 years and over)   Shingles vaccine   COVID-19 vaccine    *Based on current recommendations by the CDC due to COVID-19, please maintain social distancing at this time  Patient and family verbalized understanding of above care plan  With any questions, please contact our office at [phone]

## 2022-01-17 NOTE — PROGRESS NOTES
[facility]  [address] [facility], [address] [place] [address], [address] [place] [address]  [phone]    Care Conference: Resources Provided    LCSW provided the following resources, along with a copy of care plan:    General Information  - 10 Ways to Love Your Brain  - Memory loss ladder  - Packet with Alzheimer's Association information including: definition of dementia, communication tips for different stages, common behaviors and response strategies    Caregiver Support  - Flyer for [facility] Virtual Caregiver Support Group (meeting monthly by [employer])  - Alzheimer's Association Rx Pad (guide to website and 24/7 helpline, 7-987.463.9426)    Safety  - [facility] fall prevention / home safety [address] (containing information on home care agencies, senior centers, adult day centers and more)    Other  - Mediterranean Diet Handout  - Blister packaging information  - Alzheimer's [place] Caregiver Tip Sheets: Anxiety        MoCA repeated today as pt reported feeling rushed at initial assessment       [place] Cognitive Assessment (MoCA) Version 8 2  Education: Master's Degree    Points Earned POSSIBLE Points   Visuospatial/Executive   Alternating Sula Making *** 1   Visuoconstructional skills *** 1   Visuoconstructional skills (clock) *** 3   Naming   Naming Animals *** 3   Attention   Digit Span *** 2   Vigilance (letters) *** 1   Serial 7 subtraction *** 3   Language   Sentence Repetition *** 2   Verbal fluency *** 1   Abstraction   Abstraction (word pairings) *** 2   Delayed recall   Delayed recall *** 5   Memory index score: ***/15   Orientation   Orientation *** 6   TOTAL SCORE: ***/30  (Normal ?26/30)   Additional notes:

## 2022-01-17 NOTE — PATIENT INSTRUCTIONS
Here are a few of the resources we discussed today:  · Memory Cafe - [address] [facility] is a free, accessible and welcoming place for people with memory loss and their care partners to gather for fun, activities and refreshments  There is a memory cafe at the [facility] ([address] [place] [address]) which meets bi-monthly on the 2nd and 4th Thursdays of the month from 10-12 noon  If you are interested, please call [phone]  · Silver Sneakers - This program offers both online and in-person classes and is available at no cost for adults 65+ through select Medicare plans  If you are interested, please call [phone]  · Senior Centers - Please see attached resource guide from the [employer] [facility] which contains a listing of local senior centers here in the [place]  Senior Centers would offer opportunities for increased socialization, a nutritious meal, and a variety of activities  Please note, [facility] will be reaching out to you to schedule your in-home speech therapy

## 2022-01-18 NOTE — PROGRESS NOTES
[facility]  [address] [facility], [address] [place] [address], [address] [place] [address]    GERIATRIC ASSESSMENT FOLLOW-UP  CARE PLAN CONFERENCE      NAME: Amalia Haynes  AGE: 80 y o  SEX: female    DATE OF ENCOUNTER: 1/17/2022    Assessment and Plan     Problem List Items Addressed This Visit        Digestive    Gastroesophageal reflux disease without esophagitis       Nervous and Auditory    Dementia ([address] ) - Primary       Other    Vitamin D deficiency        See accompanying care plan note for detailed assessment and plan    This note was not shared with the patient due to privacy exception: note includes other individuals    - Counseling Documentation: patient was counseled regarding: diagnostic results, instructions for management, risk factor reductions, patient and family education, impressions, risks and benefits of treatment options and importance of compliance with treatment    Chief Complaint     Patient presents for her care plan conference    History of Present Illness     HPI  Patient presents for her care plan conference after recent comprehensive geriatric assessment intake  Prior to starting the conference, I did have a detailed discussion with the patient's  who had brought along an extensive list of questions   explains that he disagreed with our mode of testing and results noted on patient's MOCA  He also felt that the Neurotrax was somewhat unfair as the patient does have Dupuytren's contracture of her right hand which may have limited her ability to click a mouse  No obvious disbility noted by staff during the test however   openly admitted that 'the patient does not have any overt cognitive deficits and simply becomes anxious at times '   I did spend a prolonged time period explaining to her  that despite significant cognitive deficits noted on the patient's [place] our assessment and diagnosis  of dementia is made on a clinical basis and not solely on a score   I relayed that during her prior office visit she required  frequent reorientation and redirection and had significant word-finding difficulties obvious to myself, MSW and staff at the office alluding to obvious cognitive deficits and not simply anxiety alone  I also related to the patient's  that the patient was unaware of their recent trip to [place] which  disagreed she had forgotten and was simply not answering correctly   was very adamant about avoiding discussing a diagnosis of dementia as the patient's daughter-in-law had related to her previously that she has 'dementia and will only have 2 more years to live'   explains that since this comment was made by the patient's daughter in law, the patient has perseverated on dementia and has worsened her overall well-being   requesting that we discuss recommendations only in the presence of the patient  I did however relay to the patient in the presence of her  that she was assessed as having dementia after the patient herself asked the question of her diagnosis  However we focused mostly on evidence based recommendations and strategies moving forward  Advised  to be open to our discussion today as it was clear to myself and staff present at the care plan conference that  is somewhat in denial about his wife's decline in cognition  Since her geriatric intake, the patient denied any acute changes and has had no cardiorespiratory distress, fever, chills, URI, urinary symptoms or recent falls  She continues to tolerate oral intake, has been sleeping well and having regular bowel movements    Per  no overt changes in mood, personality or behaviors    The following portions of the patient's history were reviewed and updated as appropriate: allergies, current medications, past family history, past medical history, past social history, past surgical history and problem list     Review of Systems Review of Systems   Constitutional: Negative for activity change and fatigue  HENT: Negative for hearing loss  Respiratory: Negative for cough, chest tightness and shortness of breath  Cardiovascular: Negative for chest pain  Gastrointestinal: Negative for abdominal pain and constipation  Genitourinary: Negative for decreased urine volume  Musculoskeletal: Negative for arthralgias and gait problem  Neurological: Negative for weakness  Psychiatric/Behavioral: Positive for decreased concentration  Negative for confusion and dysphoric mood  The patient is nervous/anxious  Active Problem List     Patient Active Problem List   Diagnosis    Gastroesophageal reflux disease without esophagitis    Vitamin D deficiency    Vitamin B12 deficiency    Allergies    Dementia (HCC)       Objective     /72 (BP Location: Left arm, Patient Position: Sitting, Cuff Size: Standard)   Pulse 73   Temp 97 6 °F (36 4 °C) (Temporal)   Resp 18   Ht 5' 3 75" (1 619 m)   Wt 45 5 kg (100 lb 6 4 oz)   SpO2 97%   BMI 17 37 kg/m²     Physical Exam  Vitals reviewed  Constitutional:       General: She is not in acute distress  Appearance: Normal appearance  She is well-developed  She is not ill-appearing or diaphoretic  HENT:      Head: Normocephalic and atraumatic  Right Ear: Tympanic membrane, ear canal and external ear normal       Left Ear: Tympanic membrane, ear canal and external ear normal       Nose: Nose normal       Mouth/Throat:      Mouth: Mucous membranes are moist       Pharynx: No oropharyngeal exudate  Eyes:      General: No scleral icterus  Right eye: No discharge  Left eye: No discharge  Conjunctiva/sclera: Conjunctivae normal    Neck:      Vascular: No JVD  Cardiovascular:      Rate and Rhythm: Normal rate and regular rhythm  Heart sounds: Normal heart sounds  No murmur heard  No friction rub  No gallop      Pulmonary:      Effort: Pulmonary effort is normal  No respiratory distress  Breath sounds: Normal breath sounds  No wheezing or rales  Abdominal:      General: Bowel sounds are normal  There is no distension  Palpations: Abdomen is soft  Tenderness: There is no abdominal tenderness  There is no guarding  Musculoskeletal:         General: No tenderness or deformity  Normal range of motion  Cervical back: Normal range of motion and neck supple  Right lower leg: No edema  Left lower leg: No edema  Skin:     General: Skin is warm  Coloration: Skin is not pale  Findings: No erythema or rash  Neurological:      General: No focal deficit present  Mental Status: She is alert  Cranial Nerves: No cranial nerve deficit  Deep Tendon Reflexes: Reflexes are normal and symmetric  Comments: Oriented x 2  Follows commands readily  Moving all limbs   Psychiatric:         Mood and Affect: Mood normal          Pertinent Laboratory/Diagnostic Studies:  Reviewed RPR, TSH, B12 previously done by PCP  MRI neuro quant showing moderate chronic microvascular ischemic change  Focus of chronic hemosiderin deposition within left corona radiata  Does not support neuro degeneration      Current Medications     Current Outpatient Medications:     cholecalciferol (VITAMIN D3) 1,000 units tablet, Take 2,000 Units by mouth daily, Disp: , Rfl:     famotidine (PEPCID) 10 mg tablet, Take 10 mg by mouth 2 (two) times a day as needed, Disp: , Rfl:     vitamin B-12 (CYANOCOBALAMIN) 250 MCG tablet, Take 250 mcg by mouth daily, Disp: , Rfl:     carbamide peroxide (Debrox) 6 5 % otic solution, Administer 5 drops into the left ear daily at bedtime for 10 days, Disp: 15 mL, Rfl: 0    LORazepam (Ativan) 0 5 mg tablet, Take 0 5 tablets (0 25 mg total) by mouth 1 (one) time for 1 dose Take half tablet (0 25mg), thirty mins before MRI   May repeat 1hour later if ineffective, Disp: 1 tablet, Rfl: 0    Health Maintenance Health Maintenance   Topic Date Due    Pneumococcal Vaccine: 65+ Years (2 of 2 - PPSV23) 05/04/2016    COVID-19 Vaccine (3 - Booster for Fast[employer] series) 08/14/2021    Influenza Vaccine (1) 09/01/2021    Fall Risk  11/16/2021    Medicare Annual Wellness Visit (AWV)  11/16/2021    BMI: Followup Plan  08/09/2022    Depression Screening  12/29/2022    BMI: Adult  01/17/2023    DTaP,Tdap,and Td Vaccines (2 - Td or Tdap) 07/21/2030    Osteoporosis Screening  Completed    HIB Vaccine  Aged Out    Hepatitis B Vaccine  Aged Out    IPV Vaccine  Aged Out    Hepatitis A Vaccine  Aged Out    Meningococcal ACWY Vaccine  Aged Out    HPV Vaccine  Aged Out     Immunization History   Administered Date(s) Administered    COVID-19 PFIZER VACCINE 0 3 ML IM 01/26/2021, 02/14/2021    Influenza Injectable, MDCK, Preservative Free, Quadrivalent, 0 5 mL 09/14/2020    Pneumococcal Conjugate 13-Valent 05/04/2015, 05/04/2015    Tdap 07/21/2020

## 2022-03-08 ENCOUNTER — OFFICE VISIT (OUTPATIENT)
Dept: INTERNAL MEDICINE CLINIC | Facility: CLINIC | Age: 82
End: 2022-03-08
Payer: MEDICARE

## 2022-03-08 VITALS
HEIGHT: 63 IN | HEART RATE: 66 BPM | BODY MASS INDEX: 17.72 KG/M2 | WEIGHT: 100 LBS | TEMPERATURE: 97.9 F | DIASTOLIC BLOOD PRESSURE: 70 MMHG | SYSTOLIC BLOOD PRESSURE: 108 MMHG | OXYGEN SATURATION: 97 %

## 2022-03-08 DIAGNOSIS — Z13.820 ENCOUNTER FOR OSTEOPOROSIS SCREENING IN ASYMPTOMATIC POSTMENOPAUSAL PATIENT: ICD-10-CM

## 2022-03-08 DIAGNOSIS — H01.016 ULCERATIVE BLEPHARITIS OF BOTH EYES, UNSPECIFIED EYELID: ICD-10-CM

## 2022-03-08 DIAGNOSIS — Z00.00 MEDICARE ANNUAL WELLNESS VISIT, SUBSEQUENT: Primary | ICD-10-CM

## 2022-03-08 DIAGNOSIS — G30.1 LATE ONSET ALZHEIMER'S DEMENTIA WITHOUT BEHAVIORAL DISTURBANCE (HCC): ICD-10-CM

## 2022-03-08 DIAGNOSIS — Z78.0 ENCOUNTER FOR OSTEOPOROSIS SCREENING IN ASYMPTOMATIC POSTMENOPAUSAL PATIENT: ICD-10-CM

## 2022-03-08 DIAGNOSIS — Z23 NEED FOR SHINGLES VACCINE: ICD-10-CM

## 2022-03-08 DIAGNOSIS — E53.8 VITAMIN B12 DEFICIENCY: ICD-10-CM

## 2022-03-08 DIAGNOSIS — H01.013 ULCERATIVE BLEPHARITIS OF BOTH EYES, UNSPECIFIED EYELID: ICD-10-CM

## 2022-03-08 DIAGNOSIS — E44.1 MILD PROTEIN-CALORIE MALNUTRITION (HCC): ICD-10-CM

## 2022-03-08 DIAGNOSIS — Z12.31 VISIT FOR SCREENING MAMMOGRAM: ICD-10-CM

## 2022-03-08 DIAGNOSIS — E55.9 VITAMIN D DEFICIENCY, UNSPECIFIED: ICD-10-CM

## 2022-03-08 DIAGNOSIS — F02.80 LATE ONSET ALZHEIMER'S DEMENTIA WITHOUT BEHAVIORAL DISTURBANCE (HCC): ICD-10-CM

## 2022-03-08 PROCEDURE — G0439 PPPS, SUBSEQ VISIT: HCPCS | Performed by: INTERNAL MEDICINE

## 2022-03-08 PROCEDURE — 99214 OFFICE O/P EST MOD 30 MIN: CPT | Performed by: INTERNAL MEDICINE

## 2022-03-08 RX ORDER — ZOSTER VACCINE RECOMBINANT, ADJUVANTED 50 MCG/0.5
0.5 KIT INTRAMUSCULAR ONCE
Qty: 1 EACH | Refills: 1 | Status: SHIPPED | OUTPATIENT
Start: 2022-03-08 | End: 2022-03-08

## 2022-03-08 RX ORDER — VITAMIN B COMPLEX
1 CAPSULE ORAL DAILY
COMMUNITY

## 2022-03-08 NOTE — PROGRESS NOTES
Assessment/Plan:           1  Medicare annual wellness visit, subsequent    2  Late onset Alzheimer's dementia without behavioral disturbance ([facility] )  Comments:  Lab work done previously and geriatric consult reviewed  Orders:  -     CBC and differential; Future  -     Comprehensive metabolic panel; Future  -     Urinalysis with microscopic    3  Vitamin D deficiency, unspecified  Comments:  Patient is on vitamin-D supplementation  4  Vitamin B12 deficiency  Comments:  Continue B12 supplementation  5  Ulcerative blepharitis of both eyes, unspecified eyelid  Comments:  Continue eyedrops  6  Need for shingles vaccine  Comments: The need for getting vaccinated against shingles was stressed and prescriptions provided  Orders:  -     Zoster Vac Recomb Adjuvanted (Shingrix) 50 MCG/0 5ML SUSR; Inject 0 5 mL into a muscle once for 1 dose Repeat dose in 2 to 6 months    7  Mild protein-calorie malnutrition ([facility] )    8  Encounter for osteoporosis screening in asymptomatic postmenopausal patient  Comments:  DEXA scan was ordered  Orders:  -     DXA bone density spine hip and pelvis; Future; Expected date: 03/08/2022    9  Visit for screening mammogram  -     Mammo screening bilateral w 3d & cad; Future; Expected date: 03/08/2022           1  Medicare annual wellness visit, subsequent      2  Late onset Alzheimer's dementia without behavioral disturbance ([facility] )         No problem-specific Assessment & Plan notes found for this encounter  Subjective:      Patient ID: Billie Elise is a 80 y o  female  HPI    The following portions of the patient's history were reviewed and updated as appropriate: She  has a past medical history of Esophageal dilatation, Gastroesophageal reflux disease, Hypertension, essential, Other abnormal and inconclusive findings on diagnostic imaging of breast, Other rosacea, Palmar fascial fibromatosis, and Vitamin D deficiency    She   Patient Active Problem List    Diagnosis Date Noted    Mild protein-calorie malnutrition ([facility] ) 03/08/2022    Dementia ([facility] ) 11/23/2021    Gastroesophageal reflux disease without esophagitis 09/14/2020    Vitamin D deficiency 09/14/2020    Vitamin B12 deficiency 09/14/2020    Allergies 09/14/2020     She  has a past surgical history that includes Tubal ligation (1976) and Colonoscopy (12/21/2011)  Her family history includes Colon cancer (age of onset: 61) in her paternal grandfather; Hypertension in her family; No Known Problems in her maternal aunt, maternal grandfather, maternal grandmother, paternal aunt, paternal aunt, paternal aunt, paternal aunt, and paternal grandmother; Prostate cancer in her family; Prostate cancer (age of onset: 67) in her brother; Stroke in her father and mother  She  reports that she has never smoked  She has never used smokeless tobacco  She reports current alcohol use  She reports previous drug use  Current Outpatient Medications   Medication Sig Dispense Refill    b complex vitamins capsule Take 1 capsule by mouth daily      cholecalciferol (VITAMIN D3) 1,000 units tablet Take 2,000 Units by mouth daily      famotidine (PEPCID) 10 mg tablet Take 10 mg by mouth 2 (two) times a day as needed      vitamin B-12 (CYANOCOBALAMIN) 250 MCG tablet Take 250 mcg by mouth daily      Zoster Vac Recomb Adjuvanted (Shingrix) 50 MCG/0 5ML SUSR Inject 0 5 mL into a muscle once for 1 dose Repeat dose in 2 to 6 months 1 each 1     No current facility-administered medications for this visit       Current Outpatient Medications on File Prior to Visit   Medication Sig    b complex vitamins capsule Take 1 capsule by mouth daily    cholecalciferol (VITAMIN D3) 1,000 units tablet Take 2,000 Units by mouth daily    famotidine (PEPCID) 10 mg tablet Take 10 mg by mouth 2 (two) times a day as needed    vitamin B-12 (CYANOCOBALAMIN) 250 MCG tablet Take 250 mcg by mouth daily    [DISCONTINUED] carbamide peroxide (Debrox) 6 5 % otic solution Administer 5 drops into the left ear daily at bedtime for 10 days    [DISCONTINUED] LORazepam (Ativan) 0 5 mg tablet Take 0 5 tablets (0 25 mg total) by mouth 1 (one) time for 1 dose Take half tablet (0 25mg), thirty mins before MRI  May repeat 1hour later if ineffective     No current facility-administered medications on file prior to visit  She is allergic to dust mite extract, aspirin, bactrim [sulfamethoxazole-trimethoprim], pollen extract, and proton pump inhibitors       Review of Systems   Constitutional: Negative for appetite change, chills, fatigue and fever  HENT: Negative for sore throat and trouble swallowing  Eyes: Negative for redness  Respiratory: Negative for shortness of breath  Cardiovascular: Negative for chest pain and palpitations  Gastrointestinal: Negative for abdominal pain, constipation and diarrhea  Genitourinary: Negative for dysuria and hematuria  Musculoskeletal: Negative for back pain and neck pain  Skin: Negative for rash  Neurological: Negative for seizures, weakness and headaches  Hematological: Negative for adenopathy  Psychiatric/Behavioral: Negative for confusion  The patient is not nervous/anxious  Objective:      /70 (BP Location: Right arm, Patient Position: Sitting, Cuff Size: Standard)   Pulse 66   Temp 97 9 °F (36 6 °C) (Temporal)   Ht 5' 3" (1 6 m)   Wt 45 4 kg (100 lb)   SpO2 97%   BMI 17 71 kg/m²     Results Reviewed     None          No results found for this or any previous visit (from the past 1344 hour(s))  Physical Exam  Constitutional:       General: She is not in acute distress  Appearance: Normal appearance  HENT:      Head: Normocephalic and atraumatic  Nose: Nose normal       Mouth/Throat:      Mouth: Mucous membranes are moist    Eyes:      Extraocular Movements: Extraocular movements intact  Pupils: Pupils are equal, round, and reactive to light     Cardiovascular:      Rate and Rhythm: Normal rate and regular rhythm  Pulses: Normal pulses  Heart sounds: Normal heart sounds  No murmur heard  No friction rub  Pulmonary:      Effort: Pulmonary effort is normal  No respiratory distress  Breath sounds: Normal breath sounds  No wheezing  Abdominal:      General: Abdomen is flat  Bowel sounds are normal  There is no distension  Palpations: Abdomen is soft  There is no mass  Tenderness: There is no abdominal tenderness  There is no guarding  Musculoskeletal:         General: Normal range of motion  Cervical back: Normal range of motion and neck supple  Skin:     General: Skin is warm and dry  Neurological:      General: No focal deficit present  Mental Status: She is alert and oriented to person, place, and time  Mental status is at baseline  Cranial Nerves: No cranial nerve deficit  Psychiatric:         Mood and Affect: Mood normal          Behavior: Behavior normal        BMI Counseling: Body mass index is 17 71 kg/m²  The BMI is below normal  Patient was advised to gain weight

## 2022-03-08 NOTE — PROGRESS NOTES
Assessment and Plan:     Problem List Items Addressed This Visit     None      Visit Diagnoses     Medicare annual wellness visit, subsequent    -  Primary           Preventive health issues were discussed with patient, and age appropriate screening tests were ordered as noted in patient's After Visit Summary  Personalized health advice and appropriate referrals for health education or preventive services given if needed, as noted in patient's After Visit Summary       History of Present Illness:     Patient presents for Medicare Annual Wellness visit    Patient Care Team:  Aravind Bhakta MD as PCP - General (Internal Medicine)     Problem List:     Patient Active Problem List   Diagnosis    Gastroesophageal reflux disease without esophagitis    Vitamin D deficiency    Vitamin B12 deficiency    Allergies    Dementia ([address] )      Past Medical and Surgical History:     Past Medical History:   Diagnosis Date    Esophageal dilatation     Gastroesophageal reflux disease     Hypertension, essential     Other abnormal and inconclusive findings on diagnostic imaging of breast     Other rosacea     Palmar fascial fibromatosis     Vitamin D deficiency      Past Surgical History:   Procedure Laterality Date    COLONOSCOPY  12/21/2011    TUBAL LIGATION  1976      Family History:     Family History   Problem Relation Age of Onset    Stroke Mother     Stroke Father     No Known Problems Maternal Grandmother     No Known Problems Maternal Grandfather     No Known Problems Paternal Grandmother     Colon cancer Paternal Grandfather 61    Prostate cancer Brother 67    No Known Problems Maternal Aunt     No Known Problems Paternal Aunt     No Known Problems Paternal Aunt     No Known Problems Paternal Aunt     No Known Problems Paternal Aunt     Prostate cancer Family     Hypertension Family       Social History:     Social History     Socioeconomic History    Marital status: /Civil Union     Spouse name: None    Number of children: None    Years of education: None    Highest education level: None   Occupational History    Occupation: Retired   Tobacco Use    Smoking status: Never Smoker    Smokeless tobacco: Never Used   Vaping Use    Vaping Use: Never used   Substance and Sexual Activity    Alcohol use: Yes     Comment: 1 glass of wine a week    Drug use: Not Currently    Sexual activity: None   Other Topics Concern    None   Social History Narrative    Alcohol: Negative - As per [employer]     Social Determinants of Health     Financial Resource Strain: Not on file   Food Insecurity: Not on file   Transportation Needs: Not on file   Physical Activity: Not on file   Stress: Not on file   Social Connections: Not on file   Intimate Partner Violence: Not on file   Housing Stability: Not on file      Medications and Allergies:     Current Outpatient Medications   Medication Sig Dispense Refill    b complex vitamins capsule Take 1 capsule by mouth daily      cholecalciferol (VITAMIN D3) 1,000 units tablet Take 2,000 Units by mouth daily      famotidine (PEPCID) 10 mg tablet Take 10 mg by mouth 2 (two) times a day as needed      vitamin B-12 (CYANOCOBALAMIN) 250 MCG tablet Take 250 mcg by mouth daily       No current facility-administered medications for this visit  Allergies   Allergen Reactions    Dust Mite Extract Shortness Of Breath    Aspirin     Bactrim [Sulfamethoxazole-Trimethoprim]     Pollen Extract Other (See Comments)    Proton Pump Inhibitors       Immunizations:     Immunization History   Administered Date(s) Administered    COVID-19 PFIZER VACCINE 0 3 ML IM 01/26/2021, 02/14/2021    INFLUENZA 09/01/2021    Influenza Injectable, MDCK, Preservative Free, Quadrivalent, 0 5 mL 09/14/2020    Pneumococcal Conjugate 13-Valent 05/04/2015, 05/04/2015    Tdap 07/21/2020      Health Maintenance: There are no preventive care reminders to display for this patient        Topic Date Due    Pneumococcal Vaccine: 65+ Years (2 of 2 - PPSV23) 05/04/2016    COVID-19 Vaccine (3 - Booster for Aidan Murrell series) 07/14/2021      Medicare Health Risk Assessment:     /70 (BP Location: Right arm, Patient Position: Sitting, Cuff Size: Standard)   Pulse 66   Temp 97 9 °F (36 6 °C) (Temporal)   Ht 5' 3" (1 6 m)   Wt 45 4 kg (100 lb)   SpO2 97%   BMI 17 71 kg/m²      Delaney Lopez is here for her Subsequent Wellness visit  Health Risk Assessment:   Patient rates overall health as good  Patient feels that their physical health rating is same  Patient is satisfied with their life  Eyesight was rated as same  Hearing was rated as same  Patient feels that their emotional and mental health rating is same  Patients states they are never, rarely angry  Patient states they are never, rarely unusually tired/fatigued  Pain experienced in the last 7 days has been some  Patient's pain rating has been 3/10  Patient states that she has experienced no weight loss or gain in last 6 months  Depression Screening:   PHQ-2 Score: 0      Fall Risk Screening: In the past year, patient has experienced: no history of falling in past year      Urinary Incontinence Screening:   Patient has not leaked urine accidently in the last six months  Home Safety:  Patient does not have trouble with stairs inside or outside of their home  Patient has working smoke alarms and has working carbon monoxide detector  Home safety hazards include: none  Nutrition:   Current diet is Regular  Medications:   Patient is currently taking over-the-counter supplements  OTC medications include: see medication list  Patient is able to manage medications  Activities of Daily Living (ADLs)/Instrumental Activities of Daily Living (IADLs):   Walk and transfer into and out of bed and chair?: Yes  Dress and groom yourself?: Yes    Bathe or shower yourself?: Yes    Feed yourself?  Yes  Do your laundry/housekeeping?: Yes  Manage your money, pay your bills and track your expenses?: Yes  Make your own meals?: Yes    Do your own shopping?: Yes    PREVENTIVE SCREENINGS        Diabetes Screening:     General: Screening Current      Cervical Cancer Screening:    General: Screening Not Indicated      Lung Cancer Screening:     General: Screening Not Indicated    Screening, Brief Intervention, and Referral to Treatment (SBIRT)    Screening  Typical number of drinks in a day: 0  Typical number of drinks in a week: 2  Interpretation: Low risk drinking behavior  Single Item Drug Screening:  How often have you used an illegal drug (including marijuana) or a prescription medication for non-medical reasons in the past year? never    Single Item Drug Screen Score: 0  Interpretation: Negative screen for possible drug use disorder    Other Counseling Topics:   Car/seat belt/driving safety, skin self-exam, sunscreen and regular weightbearing exercise and calcium and vitamin D intake         Rachele Vargas MD

## 2022-06-23 ENCOUNTER — TELEPHONE (OUTPATIENT)
Dept: GERIATRICS | Age: 82
End: 2022-06-23

## 2022-06-23 NOTE — TELEPHONE ENCOUNTER
Pt  called our office and stated "I would like to speak with Dr Dianne Umanzor in regards to starting [facility] on a medication to help slow down dementia"  I stated Dr Dianne Umanzor is busy seeing pts right now and could not take a phone call  I also Mentioned before medication could be prescribed the pt would need to be seen  Pt  confirmed   Due to Dr Dianne Umanzor schedule being booked I scheduled pt with ANNAMARIA Wilkins on 7/13/2022 at 9 AM

## 2022-06-28 ENCOUNTER — RA CDI HCC (OUTPATIENT)
Dept: OTHER | Facility: HOSPITAL | Age: 82
End: 2022-06-28

## 2022-06-28 NOTE — PROGRESS NOTES
[address]  coding opportunities       Chart reviewed, no opportunity found: CHART REVIEWED, NO OPPORTUNITY FOUND        Patients Insurance     Medicare Insurance: Medicare

## 2022-07-05 ENCOUNTER — OFFICE VISIT (OUTPATIENT)
Dept: INTERNAL MEDICINE CLINIC | Facility: CLINIC | Age: 82
End: 2022-07-05
Payer: MEDICARE

## 2022-07-05 VITALS
OXYGEN SATURATION: 98 % | WEIGHT: 98 LBS | SYSTOLIC BLOOD PRESSURE: 140 MMHG | DIASTOLIC BLOOD PRESSURE: 78 MMHG | HEIGHT: 63 IN | TEMPERATURE: 98.5 F | HEART RATE: 70 BPM | BODY MASS INDEX: 17.36 KG/M2

## 2022-07-05 DIAGNOSIS — E55.9 VITAMIN D DEFICIENCY, UNSPECIFIED: ICD-10-CM

## 2022-07-05 DIAGNOSIS — K21.9 GASTROESOPHAGEAL REFLUX DISEASE WITHOUT ESOPHAGITIS: ICD-10-CM

## 2022-07-05 DIAGNOSIS — H01.013 ULCERATIVE BLEPHARITIS OF BOTH EYES, UNSPECIFIED EYELID: ICD-10-CM

## 2022-07-05 DIAGNOSIS — H01.016 ULCERATIVE BLEPHARITIS OF BOTH EYES, UNSPECIFIED EYELID: ICD-10-CM

## 2022-07-05 DIAGNOSIS — G30.1 LATE ONSET ALZHEIMER'S DEMENTIA WITHOUT BEHAVIORAL DISTURBANCE (HCC): Primary | ICD-10-CM

## 2022-07-05 DIAGNOSIS — F02.80 LATE ONSET ALZHEIMER'S DEMENTIA WITHOUT BEHAVIORAL DISTURBANCE (HCC): Primary | ICD-10-CM

## 2022-07-05 PROCEDURE — 99214 OFFICE O/P EST MOD 30 MIN: CPT | Performed by: INTERNAL MEDICINE

## 2022-07-05 NOTE — PROGRESS NOTES
Assessment/Plan:           1  Late onset Alzheimer's dementia without behavioral disturbance ([address] )  Comments: This is stable and she is taking donepezil  2  Vitamin D deficiency, unspecified  Comments:  Continue supplementation    3  Ulcerative blepharitis of both eyes, unspecified eyelid  Comments:  Continue local care  4  Gastroesophageal reflux disease without esophagitis  Comments: This is stable famotidine 20 mg b i d  [facility] 1  Late onset Alzheimer's dementia without behavioral disturbance ([address] )         No problem-specific Assessment & Plan notes found for this encounter  Subjective:      Patient ID: Melodie Sanchez is a 80 y o  female  HPI    The following portions of the patient's history were reviewed and updated as appropriate: She  has a past medical history of Esophageal dilatation, Gastroesophageal reflux disease, Hypertension, essential, Other abnormal and inconclusive findings on diagnostic imaging of breast, Other rosacea, Palmar fascial fibromatosis, and Vitamin D deficiency  She   Patient Active Problem List    Diagnosis Date Noted    Nasal congestion 07/13/2022    Excessive cerumen in both ear canals 07/13/2022    Mild protein-calorie malnutrition ([address] ) 03/08/2022    Dementia ([address] ) 11/23/2021    Gastroesophageal reflux disease without esophagitis 09/14/2020    Vitamin D deficiency 09/14/2020    Vitamin B12 deficiency 09/14/2020    Allergies 09/14/2020     She  has a past surgical history that includes Tubal ligation (1976) and Colonoscopy (12/21/2011)  Her family history includes Colon cancer (age of onset: 61) in her paternal grandfather; Hypertension in her family; No Known Problems in her maternal aunt, maternal grandfather, maternal grandmother, paternal aunt, paternal aunt, paternal aunt, paternal aunt, and paternal grandmother; Prostate cancer in her family; Prostate cancer (age of onset: 67) in her brother; Stroke in her father and mother    She  reports that she has never smoked  She has never used smokeless tobacco  She reports current alcohol use  She reports previous drug use  Current Outpatient Medications   Medication Sig Dispense Refill    b complex vitamins capsule Take 1 capsule by mouth daily      cholecalciferol (VITAMIN D3) 1,000 units tablet Take 2,000 Units by mouth daily      famotidine (PEPCID) 10 mg tablet Take 10 mg by mouth 2 (two) times a day as needed      vitamin B-12 (CYANOCOBALAMIN) 250 MCG tablet Take 250 mcg by mouth daily      donepezil (ARICEPT) 5 mg tablet Take 1 tablet (5 mg total) by mouth daily at bedtime 30 tablet 0    fluticasone (FLONASE) 50 mcg/act nasal spray 1 spray into each nostril daily 18 2 mL 1     No current facility-administered medications for this visit  Current Outpatient Medications on File Prior to Visit   Medication Sig    b complex vitamins capsule Take 1 capsule by mouth daily    cholecalciferol (VITAMIN D3) 1,000 units tablet Take 2,000 Units by mouth daily    famotidine (PEPCID) 10 mg tablet Take 10 mg by mouth 2 (two) times a day as needed    vitamin B-12 (CYANOCOBALAMIN) 250 MCG tablet Take 250 mcg by mouth daily     No current facility-administered medications on file prior to visit  She is allergic to dust mite extract, aspirin, bactrim [sulfamethoxazole-trimethoprim], pollen extract, and proton pump inhibitors       Review of Systems   Constitutional: Negative for appetite change, chills, fatigue and fever  HENT: Negative for sore throat and trouble swallowing  Eyes: Negative for redness  Respiratory: Negative for shortness of breath  Cardiovascular: Negative for chest pain and palpitations  Gastrointestinal: Negative for abdominal pain, constipation and diarrhea  Genitourinary: Negative for dysuria and hematuria  Musculoskeletal: Negative for back pain and neck pain  Skin: Negative for rash  Neurological: Negative for seizures, weakness and headaches     Hematological: Negative for adenopathy  Psychiatric/Behavioral: Negative for confusion  The patient is not nervous/anxious  Objective:      /78 (BP Location: Right arm, Patient Position: Sitting, Cuff Size: Standard)   Pulse 70   Temp 98 5 °F (36 9 °C) (Temporal)   Ht 5' 3" (1 6 m)   Wt 44 5 kg (98 lb)   SpO2 98%   BMI 17 36 kg/m²     Results Reviewed     None          No results found for this or any previous visit (from the past 1344 hour(s))  Physical Exam  Constitutional:       General: She is not in acute distress  Appearance: Normal appearance  HENT:      Head: Normocephalic and atraumatic  Nose: Nose normal       Mouth/Throat:      Mouth: Mucous membranes are moist    Eyes:      Extraocular Movements: Extraocular movements intact  Pupils: Pupils are equal, round, and reactive to light  Cardiovascular:      Rate and Rhythm: Normal rate and regular rhythm  Pulses: Normal pulses  Heart sounds: Normal heart sounds  No murmur heard  No friction rub  Pulmonary:      Effort: Pulmonary effort is normal  No respiratory distress  Breath sounds: Normal breath sounds  No wheezing  Abdominal:      General: Abdomen is flat  Bowel sounds are normal  There is no distension  Palpations: Abdomen is soft  There is no mass  Tenderness: There is no abdominal tenderness  There is no guarding  Musculoskeletal:         General: Normal range of motion  Neurological:      General: No focal deficit present  Mental Status: She is alert and oriented to person, place, and time  Mental status is at baseline  Cranial Nerves: No cranial nerve deficit     Psychiatric:         Mood and Affect: Mood normal          Behavior: Behavior normal

## 2022-07-13 ENCOUNTER — OFFICE VISIT (OUTPATIENT)
Dept: GERIATRICS | Age: 82
End: 2022-07-13
Payer: MEDICARE

## 2022-07-13 VITALS
TEMPERATURE: 97.7 F | SYSTOLIC BLOOD PRESSURE: 138 MMHG | WEIGHT: 101.6 LBS | BODY MASS INDEX: 17.34 KG/M2 | HEIGHT: 64 IN | DIASTOLIC BLOOD PRESSURE: 74 MMHG | HEART RATE: 63 BPM | OXYGEN SATURATION: 96 %

## 2022-07-13 DIAGNOSIS — K21.9 GASTROESOPHAGEAL REFLUX DISEASE WITHOUT ESOPHAGITIS: ICD-10-CM

## 2022-07-13 DIAGNOSIS — R09.81 NASAL CONGESTION: ICD-10-CM

## 2022-07-13 DIAGNOSIS — F03.90 DEMENTIA WITHOUT BEHAVIORAL DISTURBANCE, UNSPECIFIED DEMENTIA TYPE: Primary | ICD-10-CM

## 2022-07-13 DIAGNOSIS — H61.23 EXCESSIVE CERUMEN IN BOTH EAR CANALS: ICD-10-CM

## 2022-07-13 PROCEDURE — 69210 REMOVE IMPACTED EAR WAX UNI: CPT | Performed by: NURSE PRACTITIONER

## 2022-07-13 PROCEDURE — 99214 OFFICE O/P EST MOD 30 MIN: CPT | Performed by: NURSE PRACTITIONER

## 2022-07-13 RX ORDER — FLUTICASONE PROPIONATE 50 MCG
1 SPRAY, SUSPENSION (ML) NASAL DAILY
Qty: 18.2 ML | Refills: 1 | Status: SHIPPED | OUTPATIENT
Start: 2022-07-13

## 2022-07-13 RX ORDER — DONEPEZIL HYDROCHLORIDE 5 MG/1
5 TABLET, FILM COATED ORAL
Qty: 30 TABLET | Refills: 0 | Status: SHIPPED | OUTPATIENT
Start: 2022-07-13 | End: 2022-08-12

## 2022-07-13 NOTE — PROGRESS NOTES
Ear cerumen removal    Date/Time: 7/13/2022 10:12 AM  Performed by: ANNAMARIA Dimas  Authorized by: ANNAMARIA Dimas   Universal Protocol:  Consent: Verbal consent obtained  Consent given by: patient  Patient understanding: patient states understanding of the procedure being performed  Patient identity confirmed: verbally with patient      Patient location:  Clinic  Indications / Diagnosis:  Excessive cerumen in bilateral ear canals   Procedure details:     Location:  L ear and R ear    Procedure type: curette      Approach:  Internal and natural orifice    Equipment used:  Cerette  Post-procedure details:     Complication:  None    Hearing quality:  Improved    Patient tolerance of procedure:   Tolerated well, no immediate complications

## 2022-07-13 NOTE — PROGRESS NOTES
[facility]  [address], [address], [address] [place] [facility]  [phone]    Geriatric Consultation    ASSESSMENT AND PLAN:  There are no diagnoses linked to this encounter  HPI:    We had the pleasure of evaluating Dain Brasher who is a 80 y o  female in Geriatric consultation today  Ms Carlene Blanco is in the office with her   She lives with her     thinks memory is deteriorating  She "runs the house" and does the house work with no issues  Memory Issues noticed since approximately one and 1/2 years ago  Stopped driving at that time  Memory affected: short term memory loss    Symptoms started: gradual  Over time the memory has:  worsened  Memory issue(s) were noted by: patient and family  Patient has difficulties with driving  Difficulty finding the right word while speaking: Yes  Requires repeat information or asking the same question repeatedly: Yes      Does patient handle own financial affairs such as balancing your checkbook, paying bills, investments: No  Difficulties with handling own financial affairs:  has always done the finances  Changes in mood or personality:No  Current or previous treatment for depression or anxiety: No  Any hallucination or delusion: No  Fluctuation in alertness: No  Sleep Issues: No  Urinary/Stool Incontinence: No  Hearing and vision issue: No  Family member with dementia and what type? Father had stroke at 80 years, Mother towards end of life some trouble with dates, etc  History of head trauma Yes, banged her head years ago and experiences occasional frontal headaches     History of alcohol or substance abuse No    Any gait or balance disorder: No  Uses: None  Any falls in the last year: No  Does the patient still drive: No       Any recent accidents, citations or getting lost in familiar places :No  Are there firearms in the house?: No    Does patient have POA:Yes  Does patient have a Living will Yes    Behavioral Symptom List:  pacing  No  agressive/combative behavior  No  agitated  No  temper outbursts  No  throwing items No  resistance to care  Yes does not like to have help  forgetfulness of actions No  hoarding/hiding objects  No  suspicious  No  withdrawn No  wandering  No  rummaging/pillaging  No  misplacing/losing objects No  personal hygiene problems  No    ROS: Review of Systems   Constitutional: Negative for appetite change, chills, fatigue, fever and unexpected weight change  HENT: Positive for congestion  Eyes: Positive for redness and itching  Negative for pain and visual disturbance  Eye itching from seasonal allergies: Pollen   Respiratory: Negative for cough, chest tightness, shortness of breath, wheezing and stridor  Cardiovascular: Negative for chest pain, palpitations and leg swelling  Gastrointestinal: Negative for abdominal pain, blood in stool, constipation, diarrhea, nausea and vomiting  Genitourinary: Negative for difficulty urinating, dysuria, flank pain and hematuria  Musculoskeletal: Negative for arthralgias, back pain, gait problem and myalgias  Skin: Negative for color change, pallor, rash and wound  Neurological: Negative for dizziness, weakness, light-headedness and headaches  Psychiatric/Behavioral: Positive for confusion  Negative for dysphoric mood and sleep disturbance  The patient is nervous/anxious           Anxiety due to life stressors  Occasional fogginess        Allergies   Allergen Reactions    Dust Mite Extract Shortness Of Breath    Aspirin     Bactrim [Sulfamethoxazole-Trimethoprim]     Pollen Extract Other (See Comments)    Proton Pump Inhibitors        Medications:    Current Outpatient Medications on File Prior to Visit   Medication Sig Dispense Refill    b complex vitamins capsule Take 1 capsule by mouth daily      cholecalciferol (VITAMIN D3) 1,000 units tablet Take 2,000 Units by mouth daily      famotidine (PEPCID) 10 mg tablet Take 10 mg by mouth 2 (two) times a day as needed      vitamin B-12 (CYANOCOBALAMIN) 250 MCG tablet Take 250 mcg by mouth daily       No current facility-administered medications on file prior to visit         History:  Past Medical History:   Diagnosis Date    Esophageal dilatation     Gastroesophageal reflux disease     Hypertension, essential     Other abnormal and inconclusive findings on diagnostic imaging of breast     Other rosacea     Palmar fascial fibromatosis     Vitamin D deficiency      Past Surgical History:   Procedure Laterality Date    COLONOSCOPY  12/21/2011    TUBAL LIGATION  1976     Family History   Problem Relation Age of Onset    Stroke Mother     Stroke Father     No Known Problems Maternal Grandmother     No Known Problems Maternal Grandfather     No Known Problems Paternal Grandmother     Colon cancer Paternal Grandfather 61    Prostate cancer Brother 67    No Known Problems Maternal Aunt     No Known Problems Paternal Aunt     No Known Problems Paternal Aunt     No Known Problems Paternal Aunt     No Known Problems Paternal Aunt     Prostate cancer Family     Hypertension Family      Social History     Socioeconomic History    Marital status: /Civil Union     Spouse name: Not on file    Number of children: Not on file    Years of education: Not on file    Highest education level: Not on file   Occupational History    Occupation: Retired   Tobacco Use    Smoking status: Never Smoker    Smokeless tobacco: Never Used   Vaping Use    Vaping Use: Never used   Substance and Sexual Activity    Alcohol use: Yes     Comment: 1 glass of wine a week    Drug use: Not Currently    Sexual activity: Not on file   Other Topics Concern    Not on file   Social History Narrative    Alcohol: Negative - As per [employer]     Social Determinants of Health     Financial Resource Strain: Not on file   Food Insecurity: Not on file   Transportation Needs: Not on file   Physical Activity: Not on file   Stress: Not on file   Social Connections: Not on file   Intimate Partner Violence: Not on file   Housing Stability: Not on file     Past Surgical History:   Procedure Laterality Date    COLONOSCOPY  12/21/2011    TUBAL LIGATION  1976       OBJECTIVE:  There were no vitals filed for this visit  There is no height or weight on file to calculate BMI  Physical Exam  Constitutional:       General: She is not in acute distress  Appearance: Normal appearance  She is normal weight  She is not ill-appearing, toxic-appearing or diaphoretic  HENT:      Head: Normocephalic and atraumatic  Right Ear: Tympanic membrane, ear canal and external ear normal  There is no impacted cerumen  Left Ear: Tympanic membrane, ear canal and external ear normal  There is no impacted cerumen  Ears:      Comments: Cerumen present in bilateral EAC's     Nose: Nose normal  No congestion or rhinorrhea  Mouth/Throat:      Mouth: Mucous membranes are moist       Pharynx: Oropharynx is clear  No oropharyngeal exudate or posterior oropharyngeal erythema  Eyes:      General: No scleral icterus  Right eye: No discharge  Left eye: No discharge  Extraocular Movements: Extraocular movements intact  Conjunctiva/sclera: Conjunctivae normal       Pupils: Pupils are equal, round, and reactive to light  Cardiovascular:      Rate and Rhythm: Normal rate and regular rhythm  Pulses: Normal pulses  Heart sounds: Normal heart sounds  Pulmonary:      Effort: Pulmonary effort is normal  No respiratory distress  Breath sounds: Normal breath sounds  No wheezing, rhonchi or rales  Abdominal:      General: Bowel sounds are normal  There is no distension  Palpations: Abdomen is soft  There is no mass  Tenderness: There is no abdominal tenderness  There is no guarding     Musculoskeletal:         General: No swelling, tenderness, deformity or signs of injury  Normal range of motion  Cervical back: Normal range of motion and neck supple  No rigidity  Right lower leg: No edema  Left lower leg: No edema  Skin:     General: Skin is warm and dry  Coloration: Skin is not jaundiced  Findings: No bruising, erythema or lesion  Neurological:      General: No focal deficit present  Mental Status: She is alert  Mental status is at baseline  Sensory: No sensory deficit  Motor: No weakness  Gait: Gait normal    Psychiatric:         Mood and Affect: Mood normal          Behavior: Behavior normal          MoCA: 9/30  GDS:  3/15  TUGT:  12 seconds    Labs & Imaging:  Lab Results   Component Value Date    WBC 7 3 08/10/2021    HGB 13 4 08/10/2021    HCT 41 9 08/10/2021    MCV 91 5 08/10/2021     08/10/2021     Lab Results   Component Value Date    SODIUM 139 08/10/2021    K 3 9 08/10/2021     08/10/2021    CO2 31 08/10/2021    BUN 19 08/10/2021    CREATININE 0 61 08/10/2021    GLUC 91 08/10/2021    CALCIUM 9 5 08/10/2021     Lab Results   Component Value Date    GKBXJKTI09 1,540 (H) 08/10/2021     Lab Results   Component Value Date    TSH 1 89 08/10/2021     No results found for: [id number], [id number]   No results found for this or any previous visit

## 2022-07-13 NOTE — PATIENT INSTRUCTIONS
Start Donepezil (Aricept) 5 mg tablet, take one table at night time  Call the office in one month for a refill, will increase to 10 mg tablet at bedtime  Flonase ordered for nasal congestion, one spray each nostril daily  Follow-up in 3 months

## 2022-08-12 ENCOUNTER — TELEPHONE (OUTPATIENT)
Dept: GERIATRICS | Age: 82
End: 2022-08-12

## 2022-08-12 DIAGNOSIS — F03.90 DEMENTIA WITHOUT BEHAVIORAL DISTURBANCE, UNSPECIFIED DEMENTIA TYPE: ICD-10-CM

## 2022-08-12 RX ORDER — DONEPEZIL HYDROCHLORIDE 10 MG/1
10 TABLET, FILM COATED ORAL
Qty: 30 TABLET | Refills: 2 | Status: SHIPPED | OUTPATIENT
Start: 2022-08-12

## 2022-08-12 NOTE — TELEPHONE ENCOUNTER
Patient's spouse, Izzy Carnes ([phone]) called to say at 7/13/22 visit patient was started on donepezil 5 mg  Patient only has two pills left, and it is the caller's understanding that patient is to continue with an increased dose of 10 mg  Please provide prescription for donepezil 10 mg to be filled with [facility]

## 2022-08-12 NOTE — TELEPHONE ENCOUNTER
That's correct  Donepezil 10 mg PO daily at hs sent to [facility]  Can you call spouse and make him aware   Thank you

## 2022-08-12 NOTE — TELEPHONE ENCOUNTER
Advised patient's spouse, Dontae Duckworth, prescription was called into [facility] to be filled  Spouse expressed that patient is concerned it might cause her too much dizziness  Stating, that if it does can they cut it in half and take the other half later in the day? Advised spouse should medication cause dizziness or any other side effects that he should contact this office  Spouse expressed understanding

## 2022-08-18 ENCOUNTER — TELEPHONE (OUTPATIENT)
Dept: GERIATRICS | Age: 82
End: 2022-08-18

## 2022-08-18 NOTE — TELEPHONE ENCOUNTER
Contacted patient's spouse, Rodolph Klinefelter, to advise of Providers instructions to reduce the dosage to 5 mg  Spouse offered a suggestion of 5 mg one day; 5 mg the next; and 10 mg on the third day   then repeat  He would like to know the providers thoughts  Spouse expressed understanding and will reduce to 5 mg at bedtime beginning this evening  Will make no other changes unless provider agrees with his suggestion and/or offers additional instructions  Provider gave these instructions via tiger text

## 2022-08-18 NOTE — TELEPHONE ENCOUNTER
Patient's spouse, Chapin Vogel ([phone]) called with the patient on the line as well  Reason for the call is since patient's donepezil was increased to 10 mg, patient is experiencing stomach upset  Patient did not take medication for the last two days, but plans to take it at bedtime this evening, 8/18/22  Patient will call again tomorrow with an update

## 2022-10-11 PROBLEM — H61.23 EXCESSIVE CERUMEN IN BOTH EAR CANALS: Status: RESOLVED | Noted: 2022-07-13 | Resolved: 2022-10-11

## 2022-10-12 ENCOUNTER — OFFICE VISIT (OUTPATIENT)
Dept: GERIATRICS | Age: 82
End: 2022-10-12
Payer: MEDICARE

## 2022-10-12 VITALS
DIASTOLIC BLOOD PRESSURE: 60 MMHG | HEIGHT: 64 IN | HEART RATE: 71 BPM | WEIGHT: 98.6 LBS | SYSTOLIC BLOOD PRESSURE: 108 MMHG | TEMPERATURE: 97.7 F | RESPIRATION RATE: 18 BRPM | BODY MASS INDEX: 16.83 KG/M2 | OXYGEN SATURATION: 99 %

## 2022-10-12 DIAGNOSIS — F03.90 DEMENTIA, UNSPECIFIED DEMENTIA SEVERITY, UNSPECIFIED DEMENTIA TYPE, UNSPECIFIED WHETHER BEHAVIORAL, PSYCHOTIC, OR MOOD DISTURBANCE OR ANXIETY (HCC): Primary | ICD-10-CM

## 2022-10-12 DIAGNOSIS — I10 HYPERTENSION, ESSENTIAL: ICD-10-CM

## 2022-10-12 DIAGNOSIS — E44.1 MILD PROTEIN-CALORIE MALNUTRITION (HCC): ICD-10-CM

## 2022-10-12 PROCEDURE — 99213 OFFICE O/P EST LOW 20 MIN: CPT | Performed by: NURSE PRACTITIONER

## 2022-10-12 NOTE — ASSESSMENT & PLAN NOTE
1  Pt independent with adl, assist with iadls  Memory mild short term memory loss, mild difficulty processing new information  ? MOCA not completed this visit (last 9/30)  Will repeat next visit  ? Pt currently taking 5mg aricept on most days  Sometimes it causes stomach upset, so she then skips that night dose and feels better next am   Cont with current dosing  If increased discomfort, would consider medication holiday  2  Cont supportive cares pt/ are a team   They have supportive friends and out of state children that they stay in [facility] with [facility]   [facility]  3  Discussed safe environment  ? Some items to consider would be door video surveillance, ID bracelet, Tile GPS - in case wandering ever occurs  ? Would recommend life alert in case both pt/ have fall and cant get to phone  4  Continue to engage in physical, cognitive, social activity  ? Cont doing games, puzzles, trivia, current event discussions  Can try "brain games" on ipad or word searches  ? Cont daily walks or exercise video if weather is too cold  ? Cont outings with friends and family  Avoid social isolation  5  Optimize chronic and acute conditions  ? Cont to f/u with providers and ensure medication compliance  ? Monitor for changes in behavior/mood- if noted, notify provider for eval  ? Avoid medications that worsen cognition - check with provider or pharmacist before starting new or OTC meds  6  Do not overwhelm pt with info  ? Do one task at a time  Use slower pace  Keep to one conversation at a time  Do not multitask

## 2022-10-12 NOTE — ASSESSMENT & PLAN NOTE
· BP stable, takes not antihypertensives  · Cont dietary and lifestyle interventions  · Stay hydrated  · Notify pcp if HA or dizziness

## 2022-10-12 NOTE — PROGRESS NOTES
Assessment & Plan:   Donnald Runner was seen today for follow-up  Diagnoses and all orders for this visit:    Dementia, unspecified dementia severity, unspecified dementia type, unspecified whether behavioral, psychotic, or mood disturbance or anxiety (HCC)    Mild protein-calorie malnutrition ([address] )    Hypertension, essential      Dementia ([address] )  1  Pt independent with adl, assist with iadls  Memory mild short term memory loss, mild difficulty processing new information  ? MOCA not completed this visit (last 9/30)  Will repeat next visit  ? Pt currently taking 5mg aricept on most days  Sometimes it causes stomach upset, so she then skips that night dose and feels better next am   Cont with current dosing  If increased discomfort, would consider medication holiday  2  Cont supportive cares pt/ are a team   They have supportive friends and out of state children that they stay in [facility] with [facility]   [facility]  3  Discussed safe environment  ? Some items to consider would be door video surveillance, ID bracelet, Tile GPS - in case wandering ever occurs  ? Would recommend life alert in case both pt/ have fall and cant get to phone  4  Continue to engage in physical, cognitive, social activity  ? Cont doing games, puzzles, trivia, current event discussions  Can try "brain games" on ipad or word searches  ? Cont daily walks or exercise video if weather is too cold  ? Cont outings with friends and family  Avoid social isolation  5  Optimize chronic and acute conditions  ? Cont to f/u with providers and ensure medication compliance  ? Monitor for changes in behavior/mood- if noted, notify provider for eval  ? Avoid medications that worsen cognition - check with provider or pharmacist before starting new or OTC meds  6  Do not overwhelm pt with info  ? Do one task at a time  Use slower pace  Keep to one conversation at a time  Do not multitask        Mild protein-calorie malnutrition ([facility] [address] )  Malnutrition Findings:      ·  weight fairly stable  Appetite good  · Cont sm frequent meals and snacks that are healthy  Pt enjoys yogurt  · Cont to keep hydrated  · If any decreased appetite, please notify  BMI Findings: Body mass index is 17 19 kg/m²  Hypertension, essential  · BP stable, takes not antihypertensives  · Cont dietary and lifestyle interventions  · Stay hydrated  · Notify pcp if HA or dizziness  HPI:  We had the pleasure of evaluating Mario Marie who is a 80 y o  female   in Geriatric follow up today  Previous MOCA:  9/30  Chart reviewed, no hospitalizations, has pcp appt next month  Comorbidities include dementia, htn, pcm  She lives with   Ms Cheo Arriaga is in the office with her     Patient states she is doing well  History obtained from pt with minimal assist by   Pt denies pain  States she is doing well  She remains active  She denies falls  Meet with group of friends on Monday  Watches tv  She continues to "run the house" - no trouble with cooking or using laundry machine  Pt is currently taking aricept 1/2 of 10mg tab  She reports having some  Stomach discomfort when waking up  Not every day  Taking 1/2 pill 2 out 3 days generally  Sometimes stomach bothersome in am, sometimes further into day  Feels crampy  Nothing really helps, tends to get better on own  She reports mild short term memory loss, no concerns per   ADL/IADL:  Independent w/adls  Assist iadls  No trouble forgetting to turn things off or how to use equipment  Does have local support friends   Walker/falls:  No AD, no recent falls  Appetite/swallow:  Good/ no swallow  Elimination/incont:  no  Anxiety/depression:  No   Worries about kids  Kids live in [place]  Sleep:  good  Pain:  no  Memory: in middle, forgets where put things  Ears/eyes/teeth:  Reading, no hearing, no pbs with teeth        She has no problems operating household appliance such as TV remote, kitchen appliances, computer  She has difficulty finding the right word while speaking: Yes  Patient requires repeat information or ask the same question repeatedly: Yes - occasionally  Do you drive: No  - gave up about 2 1/2 years ago     Do you handle your own financial affairs such as balancing your checkbook, paying bills, investments: No  Have you or your family noted any change in your mood or personality:No  Are you currently or have you been treated in the past for depression or anxiety: No  Have you noticed any gait or balance disorder: No  Uses :none  Any hallucination or delusion: No  Sleep Issues: Yes  Hearing and vision issue: No  Do you have POA:Yes  Do you have a Living will Yes  Past Medical, surgical, social, medication and allergy history and patients previous records reviewed  Family Review of Behavior [facility]:    pacing  No    agressive/combative behavior  No    agitated  No   wandering  No   resistance to care  No   hoarding/hiding objects  Yes  sometimes  suspicious  No  withdrawn Yes  rummaging/pillaging  No    misplacing/losing objects Yes  personal hygiene problems  No  forgetfulness of actions Yes   temper outbursts  No     throwing items No    ROS: Review of Systems   Constitutional: Negative for activity change, appetite change, chills and fatigue  HENT: Negative for congestion, hearing loss and trouble swallowing  Respiratory: Negative for cough and shortness of breath  Cardiovascular: Negative for chest pain  Gastrointestinal: Negative for abdominal pain, constipation, diarrhea, nausea and vomiting  Genitourinary: Negative for difficulty urinating  Musculoskeletal: Negative for arthralgias, back pain and gait problem  Neurological: Negative for dizziness and light-headedness  Psychiatric/Behavioral: Positive for decreased concentration (forgetful)   Negative for confusion, dysphoric mood, hallucinations and sleep disturbance  The patient is not nervous/anxious  Allergies:    Allergies   Allergen Reactions   • Dust Mite Extract Shortness Of Breath   • Aspirin    • Bactrim [Sulfamethoxazole-Trimethoprim]    • Pollen Extract Other (See Comments)   • Proton Pump Inhibitors        Medications:      Current Outpatient Medications:   •  b complex vitamins capsule, Take 1 capsule by mouth daily, Disp: , Rfl:   •  cholecalciferol (VITAMIN D3) 1,000 units tablet, Take 2,000 Units by mouth daily, Disp: , Rfl:   •  donepezil (ARICEPT) 10 mg tablet, Take 1 tablet (10 mg total) by mouth daily at bedtime, Disp: 30 tablet, Rfl: 2  •  famotidine (PEPCID) 10 mg tablet, Take 10 mg by mouth 2 (two) times a day as needed, Disp: , Rfl:   •  fluticasone (FLONASE) 50 mcg/act nasal spray, 1 spray into each nostril daily, Disp: 18 2 mL, Rfl: 1  •  vitamin B-12 (CYANOCOBALAMIN) 250 MCG tablet, Take 250 mcg by mouth daily, Disp: , Rfl:     Vitals:  Vitals:    10/12/22 1441   BP: 108/60   Pulse: 71   Resp: 18   Temp: 97 7 °F (36 5 °C)   SpO2: 99%       History:  Past Medical History:   Diagnosis Date   • Esophageal dilatation    • Gastroesophageal reflux disease    • Hypertension, essential    • Other abnormal and inconclusive findings on diagnostic imaging of breast    • Other rosacea    • Palmar fascial fibromatosis    • Vitamin D deficiency      Past Surgical History:   Procedure Laterality Date   • COLONOSCOPY  12/21/2011   • TUBAL LIGATION  1976     Family History   Problem Relation Age of Onset   • Stroke Mother    • Stroke Father    • No Known Problems Maternal Grandmother    • No Known Problems Maternal Grandfather    • No Known Problems Paternal Grandmother    • Colon cancer Paternal Grandfather 61   • Prostate cancer Brother 67   • No Known Problems Maternal Aunt    • No Known Problems Paternal Aunt    • No Known Problems Paternal Aunt    • No Known Problems Paternal Aunt    • No Known Problems Paternal Aunt    • Prostate cancer Family • Hypertension Family      Social History     Socioeconomic History   • Marital status: /Civil Union     Spouse name: Not on file   • Number of children: Not on file   • Years of education: Not on file   • Highest education level: Not on file   Occupational History   • Occupation: Retired   Tobacco Use   • Smoking status: Never Smoker   • Smokeless tobacco: Never Used   Vaping Use   • Vaping Use: Never used   Substance and Sexual Activity   • Alcohol use: Yes     Comment: 1 glass of wine a week   • Drug use: Not Currently   • Sexual activity: Not on file   Other Topics Concern   • Not on file   Social History Narrative    Alcohol: Negative - As per [employer]     Social Determinants of Health     Financial Resource Strain: Not on file   Food Insecurity: Not on file   Transportation Needs: Not on file   Physical Activity: Not on file   Stress: Not on file   Social Connections: Not on file   Intimate Partner Violence: Not on file   Housing Stability: Not on file     Past Surgical History:   Procedure Laterality Date   • COLONOSCOPY  12/21/2011   • TUBAL LIGATION  1976         Physical Exam:  Observed ambulation:  Steady, no AD, speed adequate   Physical Exam  Vitals and nursing note reviewed  Constitutional:       General: She is not in acute distress  Appearance: Normal appearance  She is well-developed  She is not diaphoretic  HENT:      Head: Normocephalic  Cardiovascular:      Rate and Rhythm: Normal rate  Heart sounds: No murmur heard  No friction rub  No gallop  Pulmonary:      Effort: Pulmonary effort is normal  No respiratory distress  Breath sounds: Normal breath sounds  No wheezing or rales  Abdominal:      General: Bowel sounds are normal  There is no distension  Palpations: Abdomen is soft  Tenderness: There is no abdominal tenderness  There is no rebound  Musculoskeletal:         General: Normal range of motion  Skin:     General: Skin is warm and dry  Neurological:      General: No focal deficit present  Mental Status: She is alert  Mental status is at baseline  Comments: Oriented to person, mostly tps  Cooperative, very engaged in conversation  When asked date, pt knew it was the 12th  She could not name the month  She couldn't name the town we are in or the town she is from  With prompting, she was able to name home town     Psychiatric:         Mood and Affect: Mood normal          Behavior: Behavior normal

## 2022-10-12 NOTE — ASSESSMENT & PLAN NOTE
Malnutrition Findings:      ·  weight fairly stable  Appetite good  · Cont sm frequent meals and snacks that are healthy  Pt enjoys yogurt  · Cont to keep hydrated  · If any decreased appetite, please notify  BMI Findings: Body mass index is 17 19 kg/m²

## 2022-11-08 ENCOUNTER — OFFICE VISIT (OUTPATIENT)
Dept: INTERNAL MEDICINE CLINIC | Facility: CLINIC | Age: 82
End: 2022-11-08

## 2022-11-08 VITALS
WEIGHT: 99.8 LBS | BODY MASS INDEX: 17.04 KG/M2 | DIASTOLIC BLOOD PRESSURE: 90 MMHG | HEIGHT: 64 IN | TEMPERATURE: 98 F | OXYGEN SATURATION: 98 % | SYSTOLIC BLOOD PRESSURE: 150 MMHG | HEART RATE: 65 BPM

## 2022-11-08 DIAGNOSIS — G30.1 LATE ONSET ALZHEIMER'S DEMENTIA WITHOUT BEHAVIORAL DISTURBANCE (HCC): Primary | ICD-10-CM

## 2022-11-08 DIAGNOSIS — Z23 NEED FOR INFLUENZA VACCINATION: ICD-10-CM

## 2022-11-08 DIAGNOSIS — F02.80 LATE ONSET ALZHEIMER'S DEMENTIA WITHOUT BEHAVIORAL DISTURBANCE (HCC): Primary | ICD-10-CM

## 2022-11-08 DIAGNOSIS — H01.013 ULCERATIVE BLEPHARITIS OF BOTH EYES, UNSPECIFIED EYELID: ICD-10-CM

## 2022-11-08 DIAGNOSIS — E44.1 MILD PROTEIN-CALORIE MALNUTRITION (HCC): ICD-10-CM

## 2022-11-08 DIAGNOSIS — K21.9 GASTROESOPHAGEAL REFLUX DISEASE WITHOUT ESOPHAGITIS: ICD-10-CM

## 2022-11-08 DIAGNOSIS — H01.016 ULCERATIVE BLEPHARITIS OF BOTH EYES, UNSPECIFIED EYELID: ICD-10-CM

## 2022-11-08 DIAGNOSIS — E55.9 VITAMIN D DEFICIENCY, UNSPECIFIED: ICD-10-CM

## 2022-11-08 NOTE — PROGRESS NOTES
Assessment/Plan:           1  Late onset Alzheimer's dementia without behavioral disturbance (HCC)  -     CBC and differential; Future  -     Comprehensive metabolic panel; Future  -     Urinalysis with microscopic    2  Vitamin D deficiency, unspecified    3  Ulcerative blepharitis of both eyes, unspecified eyelid    4  Gastroesophageal reflux disease without esophagitis    5  Mild protein-calorie malnutrition ([address] )    6  Need for influenza vaccination  -     FLUZONE: influenza vaccine, quadrivalent, 0 5 mL         1  Late onset Alzheimer's dementia without behavioral disturbance ([address] )      2  Vitamin D deficiency, unspecified      3  Ulcerative blepharitis of both eyes, unspecified eyelid      4  Gastroesophageal reflux disease without esophagitis      5  Mild protein-calorie malnutrition ([address] )         No problem-specific Assessment & Plan notes found for this encounter  Subjective:      Patient ID: Huyen Palacios is a 80 y o  female  HPI    The following portions of the patient's history were reviewed and updated as appropriate: She  has a past medical history of Esophageal dilatation, Gastroesophageal reflux disease, Hypertension, essential, Other abnormal and inconclusive findings on diagnostic imaging of breast, Other rosacea, Palmar fascial fibromatosis, and Vitamin D deficiency  She   Patient Active Problem List    Diagnosis Date Noted   • Hypertension, essential    • Nasal congestion 07/13/2022   • Mild protein-calorie malnutrition ([address] ) 03/08/2022   • Dementia ([address] ) 11/23/2021   • Gastroesophageal reflux disease without esophagitis 09/14/2020   • Vitamin D deficiency 09/14/2020   • Vitamin B12 deficiency 09/14/2020   • Allergies 09/14/2020     She  has a past surgical history that includes Tubal ligation (1976) and Colonoscopy (12/21/2011)  Her family history includes Colon cancer (age of onset: 61) in her paternal grandfather; Hypertension in her family;  No Known Problems in her maternal aunt, maternal grandfather, maternal grandmother, paternal aunt, paternal aunt, paternal aunt, paternal aunt, and paternal grandmother; Prostate cancer in her family; Prostate cancer (age of onset: 67) in her brother; Stroke in her father and mother  She  reports that she has never smoked  She has never used smokeless tobacco  She reports current alcohol use  She reports previous drug use  Current Outpatient Medications   Medication Sig Dispense Refill   • b complex vitamins capsule Take 1 capsule by mouth daily     • cholecalciferol (VITAMIN D3) 1,000 units tablet Take 2,000 Units by mouth daily     • famotidine (PEPCID) 10 mg tablet Take 10 mg by mouth 2 (two) times a day as needed     • vitamin B-12 (CYANOCOBALAMIN) 250 MCG tablet Take 250 mcg by mouth daily     • donepezil (ARICEPT) 10 mg tablet Take 1 tablet (10 mg total) by mouth daily at bedtime (Patient not taking: No sig reported) 30 tablet 2   • fluticasone (FLONASE) 50 mcg/act nasal spray 1 spray into each nostril daily (Patient not taking: No sig reported) 18 2 mL 1     No current facility-administered medications for this visit  Current Outpatient Medications on File Prior to Visit   Medication Sig   • b complex vitamins capsule Take 1 capsule by mouth daily   • cholecalciferol (VITAMIN D3) 1,000 units tablet Take 2,000 Units by mouth daily   • famotidine (PEPCID) 10 mg tablet Take 10 mg by mouth 2 (two) times a day as needed   • vitamin B-12 (CYANOCOBALAMIN) 250 MCG tablet Take 250 mcg by mouth daily   • donepezil (ARICEPT) 10 mg tablet Take 1 tablet (10 mg total) by mouth daily at bedtime (Patient not taking: No sig reported)   • fluticasone (FLONASE) 50 mcg/act nasal spray 1 spray into each nostril daily (Patient not taking: No sig reported)     No current facility-administered medications on file prior to visit       She is allergic to dust mite extract, aspirin, bactrim [sulfamethoxazole-trimethoprim], pollen extract, and proton pump inhibitors       Review of Systems   Constitutional: Negative for appetite change, chills, fatigue and fever  HENT: Negative for sore throat and trouble swallowing  Eyes: Negative for redness  Respiratory: Negative for shortness of breath  Cardiovascular: Negative for chest pain and palpitations  Gastrointestinal: Negative for abdominal pain, constipation and diarrhea  Genitourinary: Negative for dysuria and hematuria  Musculoskeletal: Negative for back pain and neck pain  Skin: Negative for rash  Neurological: Negative for seizures, weakness and headaches  Hematological: Negative for adenopathy  Psychiatric/Behavioral: Negative for confusion  The patient is not nervous/anxious  Objective:      /90 (BP Location: Left arm, Patient Position: Sitting, Cuff Size: Standard)   Pulse 65   Temp 98 °F (36 7 °C) (Temporal)   Ht 5' 4" (1 626 m)   Wt 45 3 kg (99 lb 12 8 oz)   SpO2 98% Comment: JUWAN  BMI 17 13 kg/m²     Results Reviewed     None          No results found for this or any previous visit (from the past 1344 hour(s))  Physical Exam  Constitutional:       General: She is not in acute distress  Appearance: Normal appearance  HENT:      Head: Normocephalic and atraumatic  Nose: Nose normal       Mouth/Throat:      Mouth: Mucous membranes are moist    Eyes:      Extraocular Movements: Extraocular movements intact  Pupils: Pupils are equal, round, and reactive to light  Comments: Ectropion   Cardiovascular:      Rate and Rhythm: Normal rate and regular rhythm  Pulses: Normal pulses  Heart sounds: Normal heart sounds  No murmur heard  No friction rub  Pulmonary:      Effort: Pulmonary effort is normal  No respiratory distress  Breath sounds: Normal breath sounds  No wheezing  Abdominal:      General: Abdomen is flat  Bowel sounds are normal  There is no distension  Palpations: Abdomen is soft  There is no mass        Tenderness: There is no abdominal tenderness  There is no guarding  Musculoskeletal:         General: Normal range of motion  Cervical back: Normal range of motion  Neurological:      General: No focal deficit present  Mental Status: She is alert  Mental status is at baseline  Cranial Nerves: No cranial nerve deficit  Comments: Cognitive deficit is present     Psychiatric:         Mood and Affect: Mood normal          Behavior: Behavior normal

## 2022-12-06 ENCOUNTER — HOSPITAL ENCOUNTER (OUTPATIENT)
Dept: RADIOLOGY | Age: 82
Discharge: HOME | End: 2022-12-06

## 2022-12-06 ENCOUNTER — TELEPHONE (OUTPATIENT)
Dept: INTERNAL MEDICINE CLINIC | Facility: CLINIC | Age: 82
End: 2022-12-06

## 2022-12-06 DIAGNOSIS — Z78.0 ENCOUNTER FOR OSTEOPOROSIS SCREENING IN ASYMPTOMATIC POSTMENOPAUSAL PATIENT: ICD-10-CM

## 2022-12-06 DIAGNOSIS — Z13.820 ENCOUNTER FOR OSTEOPOROSIS SCREENING IN ASYMPTOMATIC POSTMENOPAUSAL PATIENT: ICD-10-CM

## 2022-12-06 NOTE — TELEPHONE ENCOUNTER
----- Message from Namrata López MD sent at 12/6/2022  3:40 PM EST -----  Please tell her  that she does not have osteoporosis at this time    ----- Message -----  From: Interface, Radiology Results In  Sent: 12/6/2022   3:14 PM EST  To: Namrata López MD

## 2023-03-02 DIAGNOSIS — F03.90 DEMENTIA WITHOUT BEHAVIORAL DISTURBANCE (HCC): ICD-10-CM

## 2023-03-02 RX ORDER — DONEPEZIL HYDROCHLORIDE 10 MG/1
10 TABLET, FILM COATED ORAL
Qty: 30 TABLET | Refills: 0 | Status: SHIPPED | OUTPATIENT
Start: 2023-03-02

## 2023-03-02 RX ORDER — DONEPEZIL HYDROCHLORIDE 10 MG/1
TABLET, FILM COATED ORAL
Qty: 30 TABLET | Refills: 2 | OUTPATIENT
Start: 2023-03-02

## 2023-03-14 ENCOUNTER — OFFICE VISIT (OUTPATIENT)
Dept: INTERNAL MEDICINE CLINIC | Facility: CLINIC | Age: 83
End: 2023-03-14

## 2023-03-14 VITALS
DIASTOLIC BLOOD PRESSURE: 72 MMHG | HEART RATE: 68 BPM | HEIGHT: 64 IN | BODY MASS INDEX: 16.73 KG/M2 | SYSTOLIC BLOOD PRESSURE: 120 MMHG | WEIGHT: 98 LBS | TEMPERATURE: 98.1 F | OXYGEN SATURATION: 96 %

## 2023-03-14 DIAGNOSIS — H01.013 ULCERATIVE BLEPHARITIS OF BOTH EYES, UNSPECIFIED EYELID: ICD-10-CM

## 2023-03-14 DIAGNOSIS — E55.9 VITAMIN D DEFICIENCY, UNSPECIFIED: ICD-10-CM

## 2023-03-14 DIAGNOSIS — K21.9 GASTROESOPHAGEAL REFLUX DISEASE WITHOUT ESOPHAGITIS: Primary | ICD-10-CM

## 2023-03-14 DIAGNOSIS — H01.016 ULCERATIVE BLEPHARITIS OF BOTH EYES, UNSPECIFIED EYELID: ICD-10-CM

## 2023-03-14 DIAGNOSIS — F02.80 LATE ONSET ALZHEIMER'S DEMENTIA WITHOUT BEHAVIORAL DISTURBANCE (HCC): ICD-10-CM

## 2023-03-14 DIAGNOSIS — G56.01 CARPAL TUNNEL SYNDROME OF RIGHT WRIST: ICD-10-CM

## 2023-03-14 DIAGNOSIS — G30.1 LATE ONSET ALZHEIMER'S DEMENTIA WITHOUT BEHAVIORAL DISTURBANCE (HCC): ICD-10-CM

## 2023-03-14 DIAGNOSIS — Z00.00 MEDICARE ANNUAL WELLNESS VISIT, SUBSEQUENT: ICD-10-CM

## 2023-03-14 NOTE — PROGRESS NOTES
Assessment and Plan:     Problem List Items Addressed This Visit        Digestive    Gastroesophageal reflux disease without esophagitis - Primary   Other Visit Diagnoses     Late onset Alzheimer's dementia without behavioral disturbance ([facility] [address] )        This is stable continue donepezil  Follows with geriatrics  Relevant Orders    CBC and differential    Comprehensive metabolic panel    Vitamin D deficiency, unspecified        Ulcerative blepharitis of both eyes, unspecified eyelid        Medicare annual wellness visit, subsequent        Carpal tunnel syndrome of right wrist        Use of wrist brace recommended  Has seen hand surgery  Preventive health issues were discussed with patient, and age appropriate screening tests were ordered as noted in patient's After Visit Summary  Personalized health advice and appropriate referrals for health education or preventive services given if needed, as noted in patient's After Visit Summary  History of Present Illness:     Patient presents for a Medicare Wellness Visit    HPI   Patient Care Team:  Neeru Mcneill MD as PCP - General (Internal Medicine)     Review of Systems:     Review of Systems   Constitutional: Negative for appetite change, chills, fatigue and fever  HENT: Negative for sore throat and trouble swallowing  Eyes: Negative for redness  Respiratory: Negative for shortness of breath  Cardiovascular: Negative for chest pain and palpitations  Gastrointestinal: Negative for abdominal pain, constipation and diarrhea  Genitourinary: Negative for dysuria and hematuria  Musculoskeletal: Negative for back pain and neck pain  Skin: Negative for rash  Neurological: Negative for seizures, weakness and headaches  Hematological: Negative for adenopathy  Psychiatric/Behavioral: Negative for confusion  The patient is not nervous/anxious           Problem List:     Patient Active Problem List   Diagnosis   • Gastroesophageal reflux disease without esophagitis   • Vitamin D deficiency   • Vitamin B12 deficiency   • Allergies   • Dementia ([facility] [address] )   • Mild protein-calorie malnutrition ([facility] [address] )   • Nasal congestion   • Hypertension, essential      Past Medical and Surgical History:     Past Medical History:   Diagnosis Date   • Esophageal dilatation    • Gastroesophageal reflux disease    • Hypertension, essential    • Other abnormal and inconclusive findings on diagnostic imaging of breast    • Other rosacea    • Palmar fascial fibromatosis    • Vitamin D deficiency      Past Surgical History:   Procedure Laterality Date   • COLONOSCOPY  12/21/2011   • TUBAL LIGATION  1976      Family History:     Family History   Problem Relation Age of Onset   • Stroke Mother    • Stroke Father    • No Known Problems Maternal Grandmother    • No Known Problems Maternal Grandfather    • No Known Problems Paternal Grandmother    • Colon cancer Paternal Grandfather 61   • Prostate cancer Brother 67   • No Known Problems Maternal Aunt    • No Known Problems Paternal Aunt    • No Known Problems Paternal Aunt    • No Known Problems Paternal Aunt    • No Known Problems Paternal Aunt    • Prostate cancer Family    • Hypertension Family       Social History:     Social History     Socioeconomic History   • Marital status: /Civil Union     Spouse name: None   • Number of children: None   • Years of education: None   • Highest education level: None   Occupational History   • Occupation: Retired   Tobacco Use   • Smoking status: Never   • Smokeless tobacco: Never   Vaping Use   • Vaping Use: Never used   Substance and Sexual Activity   • Alcohol use: Yes     Comment: 1 glass of wine a week   • Drug use: Not Currently   • Sexual activity: None   Other Topics Concern   • None   Social History Narrative    Alcohol: Negative - As per [employer]     Social Determinants of Health     Financial Resource Strain: Low Risk    • Difficulty of Paying Living Expenses: Not hard at all   Food Insecurity: Not on file   Transportation Needs: No Transportation Needs   • Lack of Transportation (Medical): No   • Lack of Transportation (Non-Medical): No   Physical Activity: Not on file   Stress: Not on file   Social Connections: Not on file   Intimate Partner Violence: Not on file   Housing Stability: Not on file      Medications and Allergies:     Current Outpatient Medications   Medication Sig Dispense Refill   • b complex vitamins capsule Take 1 capsule by mouth daily     • cholecalciferol (VITAMIN D3) 1,000 units tablet Take 2,000 Units by mouth daily     • Cyanocobalamin (VITAMIN B-12 PO) Vitamin B-12     • donepezil (ARICEPT) 10 mg tablet Take 1 tablet (10 mg total) by mouth daily at bedtime 30 tablet 0   • famotidine (PEPCID) 10 mg tablet Take 10 mg by mouth if needed     • Cholecalciferol (VITAMIN D-3 PO) Vitamin D3 (Patient not taking: Reported on 3/14/2023)     • fluticasone (FLONASE) 50 mcg/act nasal spray 1 spray into each nostril daily (Patient not taking: Reported on 11/8/2022) 18 2 mL 1   • vitamin B-12 (CYANOCOBALAMIN) 250 MCG tablet Take 250 mcg by mouth daily (Patient not taking: Reported on 3/14/2023)       No current facility-administered medications for this visit  Allergies   Allergen Reactions   • Dust Mite Extract Shortness Of Breath   • Aspirin    • Bactrim [Sulfamethoxazole-Trimethoprim]    • Pollen Extract Other (See Comments)   • Proton Pump Inhibitors       Immunizations:     Immunization History   Administered Date(s) Administered   • COVID-19 PFIZER VACCINE 0 3 ML IM 01/26/2021, 02/14/2021, 09/05/2021, 05/03/2022, 09/27/2022   • INFLUENZA 09/01/2021   • Influenza Injectable, MDCK, Preservative Free, Quadrivalent, 0 5 mL 09/14/2020   • Influenza, injectable, quadrivalent, preservative free 0 5 mL 11/08/2022   • Pneumococcal Conjugate 13-Valent 05/04/2015, 05/04/2015   • Tdap 07/21/2020   • Zoster 04/19/2022      Health Maintenance:      There are no preventive care reminders to display for this patient  Topic Date Due   • Pneumococcal Vaccine: 65+ Years (2 - PPSV23 if available, else PCV20) 05/04/2016      Medicare Screening Tests and Risk Assessments:     Sharon Gonzalez is here for her Subsequent Wellness visit  Health Risk Assessment:   Patient rates overall health as good  Patient feels that their physical health rating is same  Patient is satisfied with their life  Eyesight was rated as slightly worse  Hearing was rated as same  Patient feels that their emotional and mental health rating is same  Patients states they are never, rarely angry  Patient states they are never, rarely unusually tired/fatigued  Pain experienced in the last 7 days has been none  Patient states that she has experienced no weight loss or gain in last 6 months  Depression Screening:   PHQ-2 Score: 0      Fall Risk Screening: In the past year, patient has experienced: no history of falling in past year      Urinary Incontinence Screening:   Patient has not leaked urine accidently in the last six months  Home Safety:  Patient does not have trouble with stairs inside or outside of their home  Patient has working smoke alarms and has working carbon monoxide detector  Home safety hazards include: none  Nutrition:   Current diet is Regular, Low Cholesterol, Low Saturated Fat and Limited junk food  Medications:   Patient is currently taking over-the-counter supplements  OTC medications include: see medication list  Patient is able to manage medications  Activities of Daily Living (ADLs)/Instrumental Activities of Daily Living (IADLs):   Walk and transfer into and out of bed and chair?: Yes  Dress and groom yourself?: Yes    Bathe or shower yourself?: Yes    Feed yourself?  Yes  Do your laundry/housekeeping?: Yes  Manage your money, pay your bills and track your expenses?: Yes  Make your own meals?: Yes    Do your own shopping?: Yes    Previous Hospitalizations:   Any hospitalizations or ED visits within the last 12 months?: No      Advance Care Planning:   Living will: Yes    Advanced directive: Yes      PREVENTIVE SCREENINGS        Cervical Cancer Screening:    General: Screening Not Indicated      Lung Cancer Screening:     General: Screening Not Indicated    Screening, Brief Intervention, and Referral to Treatment (SBIRT)    Screening  Typical number of drinks in a day: 0  Typical number of drinks in a week: 3  Interpretation: Low risk drinking behavior  Single Item Drug Screening:  How often have you used an illegal drug (including marijuana) or a prescription medication for non-medical reasons in the past year? never    Single Item Drug Screen Score: 0  Interpretation: Negative screen for possible drug use disorder    Other Counseling Topics:   Car/seat belt/driving safety, skin self-exam, sunscreen and regular weightbearing exercise and calcium and vitamin D intake  No results found  Physical Exam:     /72 (BP Location: Left arm, Patient Position: Sitting, Cuff Size: Standard)   Pulse 68   Temp 98 1 °F (36 7 °C) (Temporal)   Ht 5' 4" (1 626 m)   Wt 44 5 kg (98 lb)   SpO2 96% Comment: RA  BMI 16 82 kg/m²     Physical Exam  Vitals and nursing note reviewed  Constitutional:       General: She is not in acute distress  Appearance: She is well-developed  HENT:      Head: Normocephalic and atraumatic  Right Ear: Tympanic membrane normal       Left Ear: Tympanic membrane normal       Nose: Nose normal       Mouth/Throat:      Mouth: Mucous membranes are moist    Eyes:      Conjunctiva/sclera: Conjunctivae normal    Cardiovascular:      Rate and Rhythm: Normal rate and regular rhythm  Heart sounds: No murmur heard  Pulmonary:      Effort: Pulmonary effort is normal  No respiratory distress  Breath sounds: Normal breath sounds  Abdominal:      General: Abdomen is flat  Palpations: Abdomen is soft  Tenderness:  There is no abdominal tenderness  Musculoskeletal:         General: No swelling  Cervical back: Normal range of motion and neck supple  Skin:     General: Skin is warm and dry  Capillary Refill: Capillary refill takes less than 2 seconds  Neurological:      General: No focal deficit present  Mental Status: She is alert  Psychiatric:         Mood and Affect: Mood normal       BMI Counseling: Body mass index is 16 82 kg/m²  The BMI is above normal  Nutrition recommendations include reducing portion sizes      Hu Holland MD

## 2023-04-26 ENCOUNTER — OFFICE VISIT (OUTPATIENT)
Age: 83
End: 2023-04-26

## 2023-04-26 VITALS
WEIGHT: 104.8 LBS | OXYGEN SATURATION: 98 % | DIASTOLIC BLOOD PRESSURE: 68 MMHG | RESPIRATION RATE: 16 BRPM | SYSTOLIC BLOOD PRESSURE: 112 MMHG | BODY MASS INDEX: 17.89 KG/M2 | HEIGHT: 64 IN | HEART RATE: 65 BPM | TEMPERATURE: 98.6 F

## 2023-04-26 DIAGNOSIS — E44.1 MILD PROTEIN-CALORIE MALNUTRITION (HCC): ICD-10-CM

## 2023-04-26 DIAGNOSIS — F03.90 DEMENTIA, UNSPECIFIED DEMENTIA SEVERITY, UNSPECIFIED DEMENTIA TYPE, UNSPECIFIED WHETHER BEHAVIORAL, PSYCHOTIC, OR MOOD DISTURBANCE OR ANXIETY (HCC): Primary | ICD-10-CM

## 2023-04-26 DIAGNOSIS — I10 HYPERTENSION, ESSENTIAL: ICD-10-CM

## 2023-04-26 NOTE — PROGRESS NOTES
Assessment & Plan:   1  Dementia, unspecified dementia severity, unspecified dementia type, unspecified whether behavioral, psychotic, or mood disturbance or anxiety ([facility] )  Assessment & Plan:  • Attempted to repeat MoCA today but unable -patient became frustrated unable to do  o Pt is independent adl/dependent iadls  Lives with   o Mobility: No AD, no recent falls  o Pt's current cognitive level is consistent with mild dementia  • Continue to stay active  Current activity level: Good  Participates in social, cognitive, physical activity  • Monitor for changes in mood, sleep, pain control  Notify provider if any concerns  • Medication review: Appropriate for conditions  o Check with pharmacist/provider before starting any new OTC/prescription medications for potential cognitive side effects  • Optimize all acute and chronic conditions  Continue to follow-up with PCP and specialist as directed for management  • Safety concerns: None  • Caregiver stress: None  • Ensure adequate hydration, good nutrition  • Goal: To continue enjoying life        2  Hypertension, essential  Assessment & Plan:  · BP stable without headache or dizziness  · Does not take any antihypertensive medications  · Continue dietary and lifestyle interventions      3  Mild protein-calorie malnutrition ([facility] )  Assessment & Plan:  · Weight is stable, no concerns per   · Continue small frequent meals and snacks that are nutritiously and calorically dense  Ensure foods that patient enjoys  Socialize during meals  HPI:  We had the pleasure of evaluating Dalila Moore who is a 80 y o  female in Geriatric follow up today  Previous MOCA:  9/30  Comorbidities include dementia, PCM, htn  She lives with   Ms Liana Gilmore is in the office with her     Memory update per patient and her :  Patient is seen today with her     Patient answers questions appropriately, gets frustrated when she can't find right words   She does do well answering y/n questions  Patient doing well with self care  She reports memory is pretty good, not as good as it used to be  Able to do cooking and cleaning on her own,  assists with cleaning (such as carrying heavy things)  Doing good with breakfast aned lunch  Not forgetting to turn off stove  Pt and  do shopping together  Takes half pill donepizil, on occasion takes off a night   does banking   does driving  Cognitive:  Reading and watching television  Physical:  Chores and walking  Social:  Goes with friends every Monday  Enjoys getting out and about  She has difficulty finding the right word while speaking: No  Patient requires repeat information or ask the same question repeatedly: No  Do you drive: No       Do you handle your own financial affairs such as balancing your checkbook, paying bills, investments: No  Have you or your family noted any change in your mood or personality:No - occasional worries about the kids  3 children, 1 grandchild (girl)  Live spread out  Are you currently or have you been treated in the past for depression or anxiety: No  Have you noticed any gait or balance disorder: No  Uses :no AD, no recent falls  Any hallucination or delusion: No  Sleep Issues: No  Hearing and vision issue: Yes - no [place], glasses for reading only  ADL/IADL:  Independent/dependent  Appetite/swallow:  Good/no troubles  Pain:  No pain  Past Medical, surgical, social, medication and allergy history and patients previous records reviewed  Family Review of Behavior [facility]:    agitated  No   wandering  No   resistance to care  No   hoarding/hiding objects  No    withdrawn No  misplacing/losing objects Yes  personal hygiene problems  no  forgetfulness of actions Yes    ROS: Review of Systems   Constitutional: Negative for activity change, appetite change, chills and fatigue     HENT: Negative for congestion, hearing loss and trouble swallowing  Eyes: Negative for visual disturbance  Respiratory: Negative for cough and shortness of breath  Cardiovascular: Negative for chest pain and leg swelling  Gastrointestinal: Negative for abdominal pain, constipation, diarrhea, nausea and vomiting  Genitourinary: Negative for difficulty urinating  Musculoskeletal: Positive for gait problem  Negative for arthralgias and back pain  Neurological: Negative for dizziness and light-headedness  Psychiatric/Behavioral: Positive for decreased concentration (forgetful)  Allergies:    Allergies   Allergen Reactions   • Dust Mite Extract Shortness Of Breath   • Aspirin    • Bactrim [Sulfamethoxazole-Trimethoprim]    • Pollen Extract Other (See Comments)   • Proton Pump Inhibitors        Medications:      Current Outpatient Medications:   •  b complex vitamins capsule, Take 1 capsule by mouth daily, Disp: , Rfl:   •  cholecalciferol (VITAMIN D3) 1,000 units tablet, Take 2,000 Units by mouth daily, Disp: , Rfl:   •  Cyanocobalamin (VITAMIN B-12 PO), Vitamin B-12, Disp: , Rfl:   •  donepezil (ARICEPT) 10 mg tablet, TAKE ONE TABLET BY MOUTH AT BEDTIME, Disp: 30 tablet, Rfl: 2  •  famotidine (PEPCID) 10 mg tablet, Take 10 mg by mouth if needed, Disp: , Rfl:   •  Cholecalciferol (VITAMIN D-3 PO), Vitamin D3 (Patient not taking: Reported on 3/14/2023), Disp: , Rfl:   •  fluticasone (FLONASE) 50 mcg/act nasal spray, 1 spray into each nostril daily (Patient not taking: Reported on 11/8/2022), Disp: 18 2 mL, Rfl: 1  •  vitamin B-12 (CYANOCOBALAMIN) 250 MCG tablet, Take 250 mcg by mouth daily (Patient not taking: Reported on 3/14/2023), Disp: , Rfl:     Vitals:  Vitals:    04/26/23 1135   BP: 112/68   Pulse: 65   Resp: 16   Temp: 98 6 °F (37 °C)   SpO2: 98%       History:  Past Medical History:   Diagnosis Date   • Esophageal dilatation    • Gastroesophageal reflux disease    • Hypertension, essential    • Other abnormal and inconclusive findings on diagnostic imaging of breast    • Other rosacea    • Palmar fascial fibromatosis    • Vitamin D deficiency      Past Surgical History:   Procedure Laterality Date   • COLONOSCOPY  12/21/2011   • TUBAL LIGATION  1976     Family History   Problem Relation Age of Onset   • Stroke Mother    • Stroke Father    • No Known Problems Maternal Grandmother    • No Known Problems Maternal Grandfather    • No Known Problems Paternal Grandmother    • Colon cancer Paternal Grandfather 61   • Prostate cancer Brother 67   • No Known Problems Maternal Aunt    • No Known Problems Paternal Aunt    • No Known Problems Paternal Aunt    • No Known Problems Paternal Aunt    • No Known Problems Paternal Aunt    • Prostate cancer Family    • Hypertension Family      Social History     Socioeconomic History   • Marital status: /Civil Union     Spouse name: Not on file   • Number of children: Not on file   • Years of education: Not on file   • Highest education level: Not on file   Occupational History   • Occupation: Retired   Tobacco Use   • Smoking status: Never   • Smokeless tobacco: Never   Vaping Use   • Vaping Use: Never used   Substance and Sexual Activity   • Alcohol use: Yes     Comment: 1 glass of wine a week   • Drug use: Not Currently   • Sexual activity: Not on file   Other Topics Concern   • Not on file   Social History Narrative    Alcohol: Negative - As per [employer]     Social Determinants of Health     Financial Resource Strain: Low Risk    • Difficulty of Paying Living Expenses: Not hard at all   Food Insecurity: Not on file   Transportation Needs: No Transportation Needs   • Lack of Transportation (Medical): No   • Lack of Transportation (Non-Medical):  No   Physical Activity: Not on file   Stress: Not on file   Social Connections: Not on file   Intimate Partner Violence: Not on file   Housing Stability: Not on file     Past Surgical History:   Procedure Laterality Date   • COLONOSCOPY  12/21/2011   • TUBAL LIGATION  1976         Physical Exam:  Observed ambulation:  No AD, slight slower, fairly steady   Physical Exam  Vitals and nursing note reviewed  Constitutional:       General: She is not in acute distress  Appearance: Normal appearance  She is well-developed  She is not diaphoretic  HENT:      Head: Normocephalic  Cardiovascular:      Rate and Rhythm: Normal rate  Heart sounds: No murmur heard  No friction rub  No gallop  Pulmonary:      Effort: Pulmonary effort is normal  No respiratory distress  Breath sounds: Normal breath sounds  No wheezing or rales  Abdominal:      General: Bowel sounds are normal  There is no distension  Palpations: Abdomen is soft  Tenderness: There is no abdominal tenderness  There is no rebound  Musculoskeletal:         General: Normal range of motion  Skin:     General: Skin is warm and dry  Neurological:      General: No focal deficit present  Mental Status: She is alert  Mental status is at baseline        Comments: Oriented to person, partial TPS  Pleasant, cooperative, forgetful  Not able to complete MoCA today, ID 1/2, not able to do recall   Psychiatric:         Mood and Affect: Mood normal          Behavior: Behavior normal

## 2023-04-26 NOTE — ASSESSMENT & PLAN NOTE
· BP stable without headache or dizziness  · Does not take any antihypertensive medications  · Continue dietary and lifestyle interventions

## 2023-04-26 NOTE — PROGRESS NOTES
[facility]  [address], [address] [place] [address], [address] [place] [address]  [phone]    Social Work Follow-Up    LSW met with Faraz Waldron for follow up visit  LSW attempted to repeat MoCA, patient's  wanted to remain in the room  Patient's  stated patient is unable to write and that he has been helping her to write  LSW attempted 5 words, patient expressed she can not do it  MoCA discontinued  Previous score 9/30 07/13/22  LSW to remain available as needed

## 2023-04-26 NOTE — ASSESSMENT & PLAN NOTE
· Weight is stable, no concerns per   · Continue small frequent meals and snacks that are nutritiously and calorically dense  Ensure foods that patient enjoys  Socialize during meals

## 2023-04-26 NOTE — ASSESSMENT & PLAN NOTE
• Attempted to repeat MoCA today but unable -patient became frustrated unable to do  o Pt is independent adl/dependent iadls  Lives with   o Mobility: No AD, no recent falls  o Pt's current cognitive level is consistent with mild dementia  • Continue to stay active  Current activity level: Good  Participates in social, cognitive, physical activity  • Monitor for changes in mood, sleep, pain control  Notify provider if any concerns  • Medication review: Appropriate for conditions  o Check with pharmacist/provider before starting any new OTC/prescription medications for potential cognitive side effects  • Optimize all acute and chronic conditions    Continue to follow-up with PCP and specialist as directed for management  • Safety concerns: None  • Caregiver stress: None  • Ensure adequate hydration, good nutrition  • Goal: To continue enjoying life

## 2023-04-26 NOTE — PROGRESS NOTES
[facility]  [address], [address] [facility], [address] [place] [address]  [phone]    Social Work Follow-Up    LSW met with Joseph Barrios for follow up visit  Completed [facility] Cognitive Assessment, score ***/30 (previously 9/30 in 07/13/22), and Geriatric Depression Screen, ***/15 (previously 3/15 in 07/13/22)  [facility] Cognitive Assessment (MoCA) Version 8 3  Education: Masters    Points Earned POSSIBLE Points   Visuospatial/Executive   Alternating [place] Making *** 1   Visuoconstructional skills *** 1   Visuoconstructional skills (clock) *** 3   Naming   Naming Animals *** 3   Attention   Digit Span *** 2   Vigilance (letters) *** 1   Serial 7 subtraction *** 3   Language   Sentence Repetition *** 2   Verbal fluency *** 1   Abstraction   Abstraction (word pairings) *** 2   Delayed recall   Delayed recall *** 5   Memory index score: ***/15   Orientation   Orientation *** 6   TOTAL SCORE: ***/30  (Normal ?26/30)   Additional notes:      LSW to remain available as needed

## 2023-05-09 ENCOUNTER — TELEPHONE (OUTPATIENT)
Dept: INTERNAL MEDICINE CLINIC | Facility: CLINIC | Age: 83
End: 2023-05-09

## 2023-05-09 DIAGNOSIS — F02.80 LATE ONSET ALZHEIMER'S DEMENTIA WITHOUT BEHAVIORAL DISTURBANCE (HCC): Primary | ICD-10-CM

## 2023-05-09 DIAGNOSIS — G30.1 LATE ONSET ALZHEIMER'S DEMENTIA WITHOUT BEHAVIORAL DISTURBANCE (HCC): Primary | ICD-10-CM

## 2023-05-09 NOTE — TELEPHONE ENCOUNTER
called and the medication Donepezil, patient is having a hard time swallowing this pill, too large for her  Would like know if he can get 5mg  For patient

## 2023-05-12 ENCOUNTER — TELEPHONE (OUTPATIENT)
Dept: INTERNAL MEDICINE CLINIC | Facility: CLINIC | Age: 83
End: 2023-05-12

## 2023-05-12 DIAGNOSIS — G30.1 LATE ONSET ALZHEIMER'S DEMENTIA WITHOUT BEHAVIORAL DISTURBANCE (HCC): ICD-10-CM

## 2023-05-12 DIAGNOSIS — F02.80 LATE ONSET ALZHEIMER'S DEMENTIA WITHOUT BEHAVIORAL DISTURBANCE (HCC): ICD-10-CM

## 2023-05-12 RX ORDER — DONEPEZIL HYDROCHLORIDE 5 MG/1
5 TABLET, ORALLY DISINTEGRATING ORAL
Qty: 30 TABLET | Refills: 1 | Status: SHIPPED | OUTPATIENT
Start: 2023-05-12

## 2023-05-12 RX ORDER — DONEPEZIL HYDROCHLORIDE 5 MG/1
10 TABLET, ORALLY DISINTEGRATING ORAL
Qty: 60 TABLET | Refills: 0 | Status: SHIPPED | OUTPATIENT
Start: 2023-05-12 | End: 2023-05-12 | Stop reason: SDUPTHER

## 2023-05-12 NOTE — TELEPHONE ENCOUNTER
Patient walked into the office following up on this  He had not heard anything back yet  Please advise

## 2023-05-12 NOTE — TELEPHONE ENCOUNTER
[facility] in [facility] called about pescription that was faxed to pharmacy     Insurance  does not cover the Donepezil 5mg  Two at bedtime  Need to be 5mg  At bedtime, switch to 10mg  Or get a prior authorization

## 2023-06-05 LAB
ALBUMIN SERPL-MCNC: 4.2 G/DL (ref 3.6–5.1)
ALBUMIN/GLOB SERPL: 1.6 (CALC) (ref 1–2.5)
ALP SERPL-CCNC: 76 U/L (ref 37–153)
ALT SERPL-CCNC: 43 U/L (ref 6–29)
AST SERPL-CCNC: 35 U/L (ref 10–35)
BASOPHILS # BLD AUTO: 31 CELLS/UL (ref 0–200)
BASOPHILS NFR BLD AUTO: 0.4 %
BILIRUB SERPL-MCNC: 0.8 MG/DL (ref 0.2–1.2)
BUN SERPL-MCNC: 18 MG/DL (ref 7–25)
BUN/CREAT SERPL: ABNORMAL (CALC) (ref 6–22)
CALCIUM SERPL-MCNC: 9.4 MG/DL (ref 8.6–10.4)
CHLORIDE SERPL-SCNC: 103 MMOL/L (ref 98–110)
CO2 SERPL-SCNC: 31 MMOL/L (ref 20–32)
CREAT SERPL-MCNC: 0.69 MG/DL (ref 0.6–0.95)
EOSINOPHIL # BLD AUTO: 117 CELLS/UL (ref 15–500)
EOSINOPHIL NFR BLD AUTO: 1.5 %
ERYTHROCYTE [DISTWIDTH] IN BLOOD BY AUTOMATED COUNT: 12.1 % (ref 11–15)
GFR/BSA.PRED SERPLBLD CYS-BASED-ARV: 86 ML/MIN/1.73M2
GLOBULIN SER CALC-MCNC: 2.6 G/DL (CALC) (ref 1.9–3.7)
GLUCOSE SERPL-MCNC: 83 MG/DL (ref 65–99)
HCT VFR BLD AUTO: 41.9 % (ref 35–45)
HGB BLD-MCNC: 13.9 G/DL (ref 11.7–15.5)
LYMPHOCYTES # BLD AUTO: 1880 CELLS/UL (ref 850–3900)
LYMPHOCYTES NFR BLD AUTO: 24.1 %
MCH RBC QN AUTO: 30 PG (ref 27–33)
MCHC RBC AUTO-ENTMCNC: 33.2 G/DL (ref 32–36)
MCV RBC AUTO: 90.5 FL (ref 80–100)
MONOCYTES # BLD AUTO: 983 CELLS/UL (ref 200–950)
MONOCYTES NFR BLD AUTO: 12.6 %
NEUTROPHILS # BLD AUTO: 4789 CELLS/UL (ref 1500–7800)
NEUTROPHILS NFR BLD AUTO: 61.4 %
PLATELET # BLD AUTO: 171 THOUSAND/UL (ref 140–400)
PMV BLD REES-ECKER: 12 FL (ref 7.5–12.5)
POTASSIUM SERPL-SCNC: 4.6 MMOL/L (ref 3.5–5.3)
PROT SERPL-MCNC: 6.8 G/DL (ref 6.1–8.1)
RBC # BLD AUTO: 4.63 MILLION/UL (ref 3.8–5.1)
SODIUM SERPL-SCNC: 139 MMOL/L (ref 135–146)
WBC # BLD AUTO: 7.8 THOUSAND/UL (ref 3.8–10.8)

## 2023-06-20 ENCOUNTER — OFFICE VISIT (OUTPATIENT)
Dept: INTERNAL MEDICINE CLINIC | Facility: CLINIC | Age: 83
End: 2023-06-20
Payer: MEDICARE

## 2023-06-20 VITALS
SYSTOLIC BLOOD PRESSURE: 133 MMHG | TEMPERATURE: 98.4 F | HEART RATE: 67 BPM | DIASTOLIC BLOOD PRESSURE: 59 MMHG | OXYGEN SATURATION: 97 %

## 2023-06-20 DIAGNOSIS — E55.9 VITAMIN D DEFICIENCY, UNSPECIFIED: ICD-10-CM

## 2023-06-20 DIAGNOSIS — H01.016 ULCERATIVE BLEPHARITIS OF BOTH EYES, UNSPECIFIED EYELID: ICD-10-CM

## 2023-06-20 DIAGNOSIS — K21.9 GASTROESOPHAGEAL REFLUX DISEASE WITHOUT ESOPHAGITIS: ICD-10-CM

## 2023-06-20 DIAGNOSIS — F02.80 LATE ONSET ALZHEIMER'S DEMENTIA WITHOUT BEHAVIORAL DISTURBANCE (HCC): Primary | ICD-10-CM

## 2023-06-20 DIAGNOSIS — H01.013 ULCERATIVE BLEPHARITIS OF BOTH EYES, UNSPECIFIED EYELID: ICD-10-CM

## 2023-06-20 DIAGNOSIS — G30.1 LATE ONSET ALZHEIMER'S DEMENTIA WITHOUT BEHAVIORAL DISTURBANCE (HCC): Primary | ICD-10-CM

## 2023-06-20 PROCEDURE — 99214 OFFICE O/P EST MOD 30 MIN: CPT | Performed by: INTERNAL MEDICINE

## 2023-06-20 NOTE — PROGRESS NOTES
Name: Loraine Guzman      : 814      MRN: [id number]  Encounter Provider: Haylee Muñiz MD  Encounter Date: 2023   Encounter department: [address] [place] [address]  Jacinto White is an 27-year-old woman with a history of HTN, GERD, and dementia, who presented to the office, on 2023, for a three-month follow-up visit  The patient was feeling well, without any new symptoms, and no change was made to her medication-regimen  Subjective      HPI   Kathie Kymberly White is an 27-year-old woman with a history of HTN, GERD, and dementia, who presented to the office, on 2023, for a three-month follow-up visit  The patient said that she had not been experiencing any symptoms  Review of Systems   Constitutional: Negative for chills and fever  HENT: Negative for rhinorrhea and sore throat  Respiratory: Negative for cough and shortness of breath  Cardiovascular: Negative for chest pain, palpitations and leg swelling  Gastrointestinal: Negative for constipation, diarrhea, nausea and vomiting  Skin: Negative for rash and wound  Neurological: Negative for dizziness and headaches  Psychiatric/Behavioral: Negative for agitation and behavioral problems  All other systems reviewed and are negative        Current Outpatient Medications on File Prior to Visit   Medication Sig   • b complex vitamins capsule Take 1 capsule by mouth daily   • Cholecalciferol (VITAMIN D-3 PO)    • cholecalciferol (VITAMIN D3) 1,000 units tablet Take 2,000 Units by mouth daily   • Cyanocobalamin (VITAMIN B-12 PO) Vitamin B-12   • donepezil (ARICEPT ODT) 5 MG disintegrating tablet Take 1 tablet (5 mg total) by mouth daily at bedtime   • vitamin B-12 (CYANOCOBALAMIN) 250 MCG tablet Take 250 mcg by mouth daily   • famotidine (PEPCID) 10 mg tablet Take 10 mg by mouth if needed (Patient not taking: Reported on 2023)   • fluticasone (FLONASE) 50 mcg/act nasal spray 1 spray into each nostril daily (Patient not taking: Reported on 11/8/2022)       Objective     /59 (BP Location: Right arm, Patient Position: Sitting, Cuff Size: Standard)   Pulse 67   Temp 98 4 °F (36 9 °C) (Temporal)   SpO2 97% Comment: room air    Physical Exam  Constitutional:       General: She is not in acute distress  Appearance: She is not diaphoretic  HENT:      Head: Normocephalic and atraumatic  Nose: No rhinorrhea  Eyes:      General: No scleral icterus  Conjunctiva/sclera: Conjunctivae normal    Cardiovascular:      Rate and Rhythm: Regular rhythm  Heart sounds: No murmur heard  No friction rub  No gallop  Pulmonary:      Breath sounds: Normal breath sounds  No wheezing, rhonchi or rales  Abdominal:      General: There is no distension  Palpations: Abdomen is soft  Tenderness: There is no abdominal tenderness  Musculoskeletal:      Right lower leg: No edema  Left lower leg: No edema  Skin:     General: Skin is warm and dry  Findings: No rash  Neurological:      General: No focal deficit present     Psychiatric:         Mood and Affect: Mood normal          Behavior: Behavior normal        Talia Aguero MD

## 2023-06-21 DIAGNOSIS — R09.81 NASAL CONGESTION: ICD-10-CM

## 2023-06-21 NOTE — TELEPHONE ENCOUNTER
Pt was seen yesterday, refill needed for fluticasone nasal spray, pt wouldl like call back when done

## 2023-06-22 DIAGNOSIS — R09.81 NASAL CONGESTION: ICD-10-CM

## 2023-06-22 RX ORDER — FLUTICASONE PROPIONATE 50 MCG
1 SPRAY, SUSPENSION (ML) NASAL DAILY
Qty: 18.2 ML | Refills: 1 | Status: SHIPPED | OUTPATIENT
Start: 2023-06-22

## 2023-08-13 DIAGNOSIS — F02.80 LATE ONSET ALZHEIMER'S DEMENTIA WITHOUT BEHAVIORAL DISTURBANCE (HCC): ICD-10-CM

## 2023-08-13 DIAGNOSIS — G30.1 LATE ONSET ALZHEIMER'S DEMENTIA WITHOUT BEHAVIORAL DISTURBANCE (HCC): ICD-10-CM

## 2023-08-14 RX ORDER — DONEPEZIL HYDROCHLORIDE 5 MG/1
TABLET, ORALLY DISINTEGRATING ORAL
Qty: 30 TABLET | Refills: 1 | OUTPATIENT
Start: 2023-08-14

## 2023-10-26 ENCOUNTER — OFFICE VISIT (OUTPATIENT)
Age: 83
End: 2023-10-26
Payer: MEDICARE

## 2023-10-26 VITALS
BODY MASS INDEX: 17.89 KG/M2 | DIASTOLIC BLOOD PRESSURE: 70 MMHG | SYSTOLIC BLOOD PRESSURE: 124 MMHG | HEART RATE: 77 BPM | WEIGHT: 104.8 LBS | TEMPERATURE: 97.3 F | OXYGEN SATURATION: 98 % | HEIGHT: 64 IN

## 2023-10-26 DIAGNOSIS — F03.90 DEMENTIA, UNSPECIFIED DEMENTIA SEVERITY, UNSPECIFIED DEMENTIA TYPE, UNSPECIFIED WHETHER BEHAVIORAL, PSYCHOTIC, OR MOOD DISTURBANCE OR ANXIETY (HCC): Primary | ICD-10-CM

## 2023-10-26 DIAGNOSIS — K21.9 GASTROESOPHAGEAL REFLUX DISEASE WITHOUT ESOPHAGITIS: ICD-10-CM

## 2023-10-26 DIAGNOSIS — E44.1 MILD PROTEIN-CALORIE MALNUTRITION (HCC): ICD-10-CM

## 2023-10-26 DIAGNOSIS — I10 HYPERTENSION, ESSENTIAL: ICD-10-CM

## 2023-10-26 PROCEDURE — 99214 OFFICE O/P EST MOD 30 MIN: CPT | Performed by: NURSE PRACTITIONER

## 2023-10-26 NOTE — ASSESSMENT & PLAN NOTE
Pt reports her esophagus has run away  She has word finding troubles and is unable to describe more details. She did bring it up several times during visit. She denies burning, pain, trouble swallowing, upset stomach. Pt stopped taking pepcid. Asked to resume.   If this doesn't help in a week or so, recommend following up with pcp to see if appt should be moved up for further eval.  Pt and  agree

## 2023-10-26 NOTE — ASSESSMENT & PLAN NOTE
Appetite fair per patient and   Weight is up 6lb since March, no edema  Cont sm frequent meals and snacks that pt enjoys and that are nutritiously and calorically dense  Consider protein supplement if <50% meals consumed. Cont f/u with pcp for regular monitoring.

## 2023-10-26 NOTE — PROGRESS NOTES
Assessment & Plan:   1. Dementia, unspecified dementia severity, unspecified dementia type, unspecified whether behavioral, psychotic, or mood disturbance or anxiety ([address])  Assessment & Plan:  MOCA:  unable to complete. ID:  1/3 (knew other two items, but couldn't name). Recall:  3 (by description, could not name), couldn't remember other two. Oriented to month, could not relay year, age,  (knew she was in her 80's) and confirmed  when given. Pt is independent adl/dependent iadls. Lives with   Pt's current cognitive level is consistent with mild dementia w/word finding issues  Memory medications: aricept  Mobility:  no AD - had one fall over summer from loss of balance  Continue to stay active. Current activity level:  fair. Participates in some social, cognitive, physical activity. Monitor for changes in mood, sleep, pain control. Notify provider if any concerns  Medication review:  seems appropriate for current conditions  Check with pharmacist/provider before starting any new OTC/prescription medications for potential cognitive side effects  Optimize all acute and chronic conditions. Continue to follow-up with PCP and specialist as directed for management  Safety concerns:   and pt seem to be doing ok, no local family, do see friends weekly  Caregiver stress:  none  Ensure adequate hydration, good nutrition  Encourage independence and safety      2. Hypertension, essential  Assessment & Plan:  BP stable, denies ha/dizziness  Does not take antihypertensive  Cont to keep hydrated, watch for dizziness. Cont dietary and life style interventions. Follow up with pcp as directed. 3. Mild protein-calorie malnutrition ([address])  Assessment & Plan:  Appetite fair per patient and   Weight is up 6lb since March, no edema  Cont sm frequent meals and snacks that pt enjoys and that are nutritiously and calorically dense  Consider protein supplement if <50% meals consumed.   Cont f/u with pcp for regular monitoring. 4. Gastroesophageal reflux disease without esophagitis  Assessment & Plan:  Pt reports her esophagus has run away  She has word finding troubles and is unable to describe more details. She did bring it up several times during visit. She denies burning, pain, trouble swallowing, upset stomach. Pt stopped taking pepcid. Asked to resume. If this doesn't help in a week or so, recommend following up with pcp to see if appt should be moved up for further eval.  Pt and  agree      HPI:  We had the pleasure of evaluating Félix Hinson who is a 80 y.o. female in Geriatric follow up today. Previous MOCA:  <10. Comorbidities include dementia, htn, pcm  She lives with her   Ms. Corbin Escobedo is in the office with her . Memory update per patient/:  No changes since last visit. Had one fall recently, did not hurt self. Independent with aldls.  helps with buttons once in a while.  takes care of most of the cooking. Weight fairly stable. Take care of meds together.  takes care of bills.  does driving. Is not waking up confused, knows her people. No close family. When  goes shopping, she does fine at home. Cognitive:  Read the paper - sometimes forgets articles. Physical:  walk outside. Social:  every Monday friends. Not involved in [employer] or clubs. Doesn't have cell phone. Doing good with washer and . Doing good with remote of tv.       She has difficulty finding the right word while speaking: No  Patient requires repeat information or ask the same question repeatedly: No  Do you do your own bathing, dressing, feeding yourself Yes  Can you make your own meals and do light cleaning/chores No  Do you take care of your own medications Yes  Do you drive: No       Do you handle your own financial affairs such as balancing your checkbook, paying bills, investments: No  Have you or your family noted any change in your mood or personality:No  Are you currently or have you been treated in the past for depression or anxiety: No  Have you noticed any gait or balance disorder: No  Uses : no AD, had one fall over the summer  Any hallucination or delusion: No  Sleep Issues: No - off and on during the day  Urinary/Stool Incontinence: No  Hearing and vision issue: Yes - uses eye wipes for redness. ADL/IADL:  independent/dependent  Appetite/swallow:  good/good  Pain:  no    Family Review of Behavior [facility]:    pacing. No    agressive/combative behavior. No    agitated. No   wandering. No   resistance to care. No   hoarding/hiding objects. No    suspicious  No  withdrawn No  rummaging/pillaging. No    misplacing/losing objects Yes - no change from norm  personal hygiene problems. No  forgetfulness of actions Yes - no change from norm    ROS: Review of Systems   Constitutional:  Negative for activity change, appetite change, chills and fatigue. HENT:  Negative for congestion, hearing loss and trouble swallowing. Eyes:  Positive for visual disturbance (unable to comment on what is wrong). Respiratory:  Negative for cough and shortness of breath. Cardiovascular:  Negative for chest pain. Gastrointestinal:  Negative for abdominal pain, constipation, diarrhea, nausea and vomiting. Genitourinary:  Negative for difficulty urinating. Musculoskeletal:  Negative for arthralgias, back pain and gait problem. Neurological:  Negative for dizziness and light-headedness. Psychiatric/Behavioral:  Positive for decreased concentration (forgetful). Negative for dysphoric mood and sleep disturbance. The patient is not nervous/anxious. Past Medical, surgical, social, medication and allergy history and patients previous records reviewed. Allergies:    Allergies   Allergen Reactions    Dust Mite Extract Shortness Of Breath    Aspirin     Bactrim [Sulfamethoxazole-Trimethoprim]     Pollen Extract Other (See Comments) Proton Pump Inhibitors        Medications:      Current Outpatient Medications:     b complex vitamins capsule, Take 1 capsule by mouth daily, Disp: , Rfl:     cholecalciferol (VITAMIN D3) 1,000 units tablet, Take 2,000 Units by mouth daily, Disp: , Rfl:     donepezil (ARICEPT ODT) 5 MG disintegrating tablet, Take 1 tablet (5 mg total) by mouth daily at bedtime, Disp: 30 tablet, Rfl: 1    fluticasone (FLONASE) 50 mcg/act nasal spray, 1 spray into each nostril daily, Disp: 18.2 mL, Rfl: 1    vitamin B-12 (CYANOCOBALAMIN) 250 MCG tablet, Take 250 mcg by mouth daily, Disp: , Rfl:     Cholecalciferol (VITAMIN D-3 PO), , Disp: , Rfl:     Cyanocobalamin (VITAMIN B-12 PO), Vitamin B-12 (Patient not taking: Reported on 10/26/2023), Disp: , Rfl:     famotidine (PEPCID) 10 mg tablet, Take 10 mg by mouth if needed (Patient not taking: Reported on 6/20/2023), Disp: , Rfl:     Vitals:  Vitals:    10/26/23 1046   BP: 124/70   Pulse: 77   Temp: (!) 97.3 °F (36.3 °C)   SpO2: 98%       History:  Past Medical History:   Diagnosis Date    Esophageal dilatation     Gastroesophageal reflux disease     Hypertension, essential     Other abnormal and inconclusive findings on diagnostic imaging of breast     Other rosacea     Palmar fascial fibromatosis     Vitamin D deficiency      Past Surgical History:   Procedure Laterality Date    COLONOSCOPY  12/21/2011    TUBAL LIGATION  1976     Family History   Problem Relation Age of Onset    Stroke Mother     Stroke Father     No Known Problems Maternal Grandmother     No Known Problems Maternal Grandfather     No Known Problems Paternal Grandmother     Colon cancer Paternal Grandfather 61    Prostate cancer Brother 67    No Known Problems Maternal Aunt     No Known Problems Paternal Aunt     No Known Problems Paternal Aunt     No Known Problems Paternal Aunt     No Known Problems Paternal Aunt     Prostate cancer Family     Hypertension Family      Social History     Socioeconomic History Marital status: /Civil Union     Spouse name: Not on file    Number of children: Not on file    Years of education: Not on file    Highest education level: Not on file   Occupational History    Occupation: Retired   Tobacco Use    Smoking status: Never    Smokeless tobacco: Never   Vaping Use    Vaping Use: Never used   Substance and Sexual Activity    Alcohol use: Yes     Comment: 1 glass of wine a week    Drug use: Not Currently    Sexual activity: Not on file   Other Topics Concern    Not on file   Social History Narrative    Alcohol: Negative - As per [employer]     Social Determinants of Health     Financial Resource Strain: Low Risk  (3/14/2023)    Overall Financial Resource Strain (CARDIA)     Difficulty of Paying Living Expenses: Not hard at all   Food Insecurity: Not on file   Transportation Needs: No Transportation Needs (3/14/2023)    PRAPARE - Transportation     Lack of Transportation (Medical): No     Lack of Transportation (Non-Medical): No   Physical Activity: Not on file   Stress: Not on file   Social Connections: Not on file   Intimate Partner Violence: Not on file   Housing Stability: Not on file     Past Surgical History:   Procedure Laterality Date    COLONOSCOPY  12/21/2011    TUBAL LIGATION  1976         Physical Exam:  Observed ambulation:  no AD, steady, normal paced   Physical Exam  Vitals and nursing note reviewed. Constitutional:       General: She is not in acute distress. Appearance: Normal appearance. She is well-developed. She is not diaphoretic. HENT:      Head: Normocephalic. Cardiovascular:      Rate and Rhythm: Normal rate. Heart sounds: No murmur heard. No friction rub. No gallop. Pulmonary:      Effort: Pulmonary effort is normal. No respiratory distress. Breath sounds: Normal breath sounds. No wheezing or rales. Abdominal:      General: Bowel sounds are normal. There is no distension. Palpations: Abdomen is soft. Tenderness:  There is no abdominal tenderness. There is no rebound. Musculoskeletal:         General: Normal range of motion. Skin:     General: Skin is warm and dry. Neurological:      General: No focal deficit present. Mental Status: She is alert. Mental status is at baseline.    Psychiatric:         Mood and Affect: Mood normal.         Behavior: Behavior normal.

## 2023-10-26 NOTE — ASSESSMENT & PLAN NOTE
BP stable, denies ha/dizziness  Does not take antihypertensive  Cont to keep hydrated, watch for dizziness. Cont dietary and life style interventions. Follow up with pcp as directed.

## 2023-10-26 NOTE — ASSESSMENT & PLAN NOTE
MOCA:  unable to complete. ID:  1/3 (knew other two items, but couldn't name). Recall:  1/3 (by description, could not name), couldn't remember other two. Oriented to month, could not relay year, age,  (knew she was in her 80's) and confirmed  when given. Pt is independent adl/dependent iadls. Lives with   Pt's current cognitive level is consistent with mild dementia w/word finding issues  Memory medications: aricept  Mobility:  no AD - had one fall over summer from loss of balance  Continue to stay active. Current activity level:  fair. Participates in some social, cognitive, physical activity. Monitor for changes in mood, sleep, pain control. Notify provider if any concerns  Medication review:  seems appropriate for current conditions  Check with pharmacist/provider before starting any new OTC/prescription medications for potential cognitive side effects  Optimize all acute and chronic conditions.   Continue to follow-up with PCP and specialist as directed for management  Safety concerns:   and pt seem to be doing ok, no local family, do see friends weekly  Caregiver stress:  none  Ensure adequate hydration, good nutrition  Encourage independence and safety

## 2023-10-30 DIAGNOSIS — F02.80 LATE ONSET ALZHEIMER'S DEMENTIA WITHOUT BEHAVIORAL DISTURBANCE (HCC): ICD-10-CM

## 2023-10-30 DIAGNOSIS — G30.1 LATE ONSET ALZHEIMER'S DEMENTIA WITHOUT BEHAVIORAL DISTURBANCE (HCC): ICD-10-CM

## 2023-10-30 RX ORDER — DONEPEZIL HYDROCHLORIDE 5 MG/1
5 TABLET, ORALLY DISINTEGRATING ORAL
Qty: 90 TABLET | Refills: 0 | Status: SHIPPED | OUTPATIENT
Start: 2023-10-30

## 2023-11-06 ENCOUNTER — TELEPHONE (OUTPATIENT)
Dept: INTERNAL MEDICINE CLINIC | Facility: CLINIC | Age: 83
End: 2023-11-06

## 2023-11-06 NOTE — TELEPHONE ENCOUNTER
Refill Request (Patient is out of medication; please fill today)     Terazosin 5 mg     Pharmacy: [facility]; [place]     LA: 6/20/23  NA: 12/11/23

## 2023-11-09 ENCOUNTER — PATIENT OUTREACH (OUTPATIENT)
Dept: INTERNAL MEDICINE CLINIC | Facility: CLINIC | Age: 83
End: 2023-11-09

## 2023-11-09 DIAGNOSIS — Z78.9 NEED FOR FOLLOW-UP BY SOCIAL WORKER: Primary | ICD-10-CM

## 2023-11-09 NOTE — PROGRESS NOTES
Request received from PCP for Outpatient Social Work Care Manager ([facility]) to outreach patient and patient's  (Professor [place]oo [place]) to check status. PCP states patient has dementia and her  is her caregiver. Patient's children [place] of [place]. Office has been unable to reach patient's  and he missed appt in September. Call placed to mobile number for patient's  (Professor Bernabe Ramirez; on consent). No answer and unable to leave message as mailbox is full. Call placed to home number. Spoke with patient's  who states they are doing fine. States sometimes their phone does not work. Informed him that their cell phone mailbox is full. Reviewed patient's upcoming appts on 12/11 for patient and patient's . He does not feel they need to be seen prior. Informed of his missed appt in September; states he did not have this written on calendar. He has 10 pills of terazosin left and is unsure if more refills are available. Will route to PCP. Patient's  feels they are managing okay. States their son visited recently and helped them get a toaster over, as they are getting new cooking equipment in their kitchen. Denied need for Meals on Wheels or other meal services. States they do not have local relatives for support but could outreach friends if assistance is ever needed. Denied need for additional resources at this time. Encouraged him to call if future needs present.

## 2023-12-22 ENCOUNTER — OFFICE VISIT (OUTPATIENT)
Dept: INTERNAL MEDICINE CLINIC | Facility: CLINIC | Age: 83
End: 2023-12-22
Payer: MEDICARE

## 2023-12-22 VITALS
SYSTOLIC BLOOD PRESSURE: 160 MMHG | HEART RATE: 70 BPM | TEMPERATURE: 97.3 F | OXYGEN SATURATION: 98 % | DIASTOLIC BLOOD PRESSURE: 71 MMHG | BODY MASS INDEX: 17.93 KG/M2 | WEIGHT: 105 LBS | HEIGHT: 64 IN

## 2023-12-22 DIAGNOSIS — K90.89 OTHER SPECIFIED INTESTINAL MALABSORPTION: ICD-10-CM

## 2023-12-22 DIAGNOSIS — K21.9 GASTROESOPHAGEAL REFLUX DISEASE WITHOUT ESOPHAGITIS: ICD-10-CM

## 2023-12-22 DIAGNOSIS — H01.013 ULCERATIVE BLEPHARITIS OF BOTH EYES, UNSPECIFIED EYELID: ICD-10-CM

## 2023-12-22 DIAGNOSIS — G30.1 LATE ONSET ALZHEIMER'S DEMENTIA WITHOUT BEHAVIORAL DISTURBANCE (HCC): Primary | ICD-10-CM

## 2023-12-22 DIAGNOSIS — L85.3 DRY SKIN: ICD-10-CM

## 2023-12-22 DIAGNOSIS — F02.80 LATE ONSET ALZHEIMER'S DEMENTIA WITHOUT BEHAVIORAL DISTURBANCE (HCC): Primary | ICD-10-CM

## 2023-12-22 DIAGNOSIS — R63.4 WEIGHT LOSS: ICD-10-CM

## 2023-12-22 DIAGNOSIS — H01.016 ULCERATIVE BLEPHARITIS OF BOTH EYES, UNSPECIFIED EYELID: ICD-10-CM

## 2023-12-22 PROCEDURE — 99214 OFFICE O/P EST MOD 30 MIN: CPT | Performed by: INTERNAL MEDICINE

## 2023-12-22 NOTE — PROGRESS NOTES
Assessment/Plan:           1. Late onset Alzheimer's dementia without behavioral disturbance (HCC)  Comments:  Continue current care.   provide support at home.   consulted.  Orders:  -     CBC and differential; Future  -     Comprehensive metabolic panel; Future    2. Ulcerative blepharitis of both eyes, unspecified eyelid    3. Gastroesophageal reflux disease without esophagitis    4. Dry skin    5. Other specified intestinal malabsorption  -     Vitamin B12; Future  -     Vitamin D 25 hydroxy; Future    6. Weight loss  Comments:  Will check lab.  Orders:  -     Urinalysis with microscopic  -     TSH, 3rd generation; Future           1. Late onset Alzheimer's dementia without behavioral disturbance (HCC)      2. Ulcerative blepharitis of both eyes, unspecified eyelid      3. Gastroesophageal reflux disease without esophagitis         No problem-specific Assessment & Plan notes found for this encounter.           Subjective:      Patient ID: Tiffany Brizuela is a 83 y.o. female.    HPI    The following portions of the patient's history were reviewed and updated as appropriate: She  has a past medical history of Esophageal dilatation, Gastroesophageal reflux disease, Hypertension, essential, Other abnormal and inconclusive findings on diagnostic imaging of breast, Other rosacea, Palmar fascial fibromatosis, and Vitamin D deficiency.  She   Patient Active Problem List    Diagnosis Date Noted    Hypertension, essential     Nasal congestion 07/13/2022    Mild protein-calorie malnutrition (HCC) 03/08/2022    Dementia (HCC) 11/23/2021    Gastroesophageal reflux disease without esophagitis 09/14/2020    Vitamin D deficiency 09/14/2020    Vitamin B12 deficiency 09/14/2020    Allergies 09/14/2020     She  has a past surgical history that includes Tubal ligation (1976) and Colonoscopy (12/21/2011).  Her family history includes Colon cancer (age of onset: 60) in her paternal grandfather; Hypertension in  her family; No Known Problems in her maternal aunt, maternal grandfather, maternal grandmother, paternal aunt, paternal aunt, paternal aunt, paternal aunt, and paternal grandmother; Prostate cancer in her family; Prostate cancer (age of onset: 72) in her brother; Stroke in her father and mother.  She  reports that she has never smoked. She has never used smokeless tobacco. She reports current alcohol use. She reports that she does not currently use drugs.  Current Outpatient Medications   Medication Sig Dispense Refill    b complex vitamins capsule Take 1 capsule by mouth daily      cholecalciferol (VITAMIN D3) 1,000 units tablet Take 2,000 Units by mouth daily      donepezil (ARICEPT ODT) 5 MG disintegrating tablet Take 1 tablet (5 mg total) by mouth daily at bedtime 90 tablet 0    fluticasone (FLONASE) 50 mcg/act nasal spray 1 spray into each nostril daily 18.2 mL 1    vitamin B-12 (CYANOCOBALAMIN) 250 MCG tablet Take 250 mcg by mouth daily      Cholecalciferol (VITAMIN D-3 PO)  (Patient not taking: Reported on 10/26/2023)      Cyanocobalamin (VITAMIN B-12 PO) Vitamin B-12 (Patient not taking: Reported on 10/26/2023)      famotidine (PEPCID) 10 mg tablet Take 10 mg by mouth if needed (Patient not taking: Reported on 6/20/2023)       No current facility-administered medications for this visit.     Current Outpatient Medications on File Prior to Visit   Medication Sig    b complex vitamins capsule Take 1 capsule by mouth daily    cholecalciferol (VITAMIN D3) 1,000 units tablet Take 2,000 Units by mouth daily    donepezil (ARICEPT ODT) 5 MG disintegrating tablet Take 1 tablet (5 mg total) by mouth daily at bedtime    fluticasone (FLONASE) 50 mcg/act nasal spray 1 spray into each nostril daily    vitamin B-12 (CYANOCOBALAMIN) 250 MCG tablet Take 250 mcg by mouth daily    Cholecalciferol (VITAMIN D-3 PO)  (Patient not taking: Reported on 10/26/2023)    Cyanocobalamin (VITAMIN B-12 PO) Vitamin B-12 (Patient not taking:  "Reported on 10/26/2023)    famotidine (PEPCID) 10 mg tablet Take 10 mg by mouth if needed (Patient not taking: Reported on 6/20/2023)     No current facility-administered medications on file prior to visit.     There are no discontinued medications.   She is allergic to dust mite extract, aspirin, bactrim [sulfamethoxazole-trimethoprim], pollen extract, and proton pump inhibitors..    Review of Systems   Constitutional:  Negative for appetite change, chills, fatigue and fever.   HENT:  Negative for sore throat and trouble swallowing.    Eyes:  Positive for redness.   Respiratory:  Negative for shortness of breath.    Cardiovascular:  Negative for chest pain and palpitations.   Gastrointestinal:  Negative for abdominal pain, constipation and diarrhea.   Genitourinary:  Negative for dysuria and hematuria.   Musculoskeletal:  Negative for back pain and neck pain.   Skin:  Negative for rash.   Neurological:  Negative for seizures, weakness and headaches.   Hematological:  Negative for adenopathy.   Psychiatric/Behavioral:  Negative for confusion. The patient is not nervous/anxious.          Objective:      /71 (BP Location: Left arm, Patient Position: Sitting, Cuff Size: Standard)   Pulse 70   Temp (!) 97.3 °F (36.3 °C) (Temporal)   Ht 5' 3.78\" (1.62 m)   Wt 47.6 kg (105 lb)   SpO2 98%   BMI 18.15 kg/m²     Results Reviewed       None            Recent Results (from the past 1344 hour(s))   Comprehensive metabolic panel    Collection Time: 12/30/23 11:04 AM   Result Value Ref Range    Glucose, Random 83 65 - 99 mg/dL    BUN 18 7 - 25 mg/dL    Creatinine 0.66 0.60 - 0.95 mg/dL    eGFR 87 > OR = 60 mL/min/1.73m2    SL AMB BUN/CREATININE RATIO SEE NOTE: 6 - 22 (calc)    Sodium 141 135 - 146 mmol/L    Potassium 4.1 3.5 - 5.3 mmol/L    Chloride 103 98 - 110 mmol/L    CO2 30 20 - 32 mmol/L    Calcium 9.7 8.6 - 10.4 mg/dL    Protein, Total 7.3 6.1 - 8.1 g/dL    Albumin 4.4 3.6 - 5.1 g/dL    Globulin 2.9 1.9 - 3.7 " g/dL (calc)    Albumin/Globulin Ratio 1.5 1.0 - 2.5 (calc)    TOTAL BILIRUBIN 1.0 0.2 - 1.2 mg/dL    Alkaline Phosphatase 64 37 - 153 U/L    AST 22 10 - 35 U/L    ALT 20 6 - 29 U/L   CBC and differential    Collection Time: 12/30/23 11:04 AM   Result Value Ref Range    White Blood Cell Count 9.3 3.8 - 10.8 Thousand/uL    Red Blood Cell Count 4.77 3.80 - 5.10 Million/uL    Hemoglobin 14.4 11.7 - 15.5 g/dL    HCT 43.3 35.0 - 45.0 %    MCV 90.8 80.0 - 100.0 fL    MCH 30.2 27.0 - 33.0 pg    MCHC 33.3 32.0 - 36.0 g/dL    RDW 12.3 11.0 - 15.0 %    Platelet Count 207 140 - 400 Thousand/uL    SL AMB MPV 11.4 7.5 - 12.5 fL    Neutrophils (Absolute) 5,915 1,500 - 7,800 cells/uL    Lymphocytes (Absolute) 2,139 850 - 3,900 cells/uL    Monocytes (Absolute) 1,088 (H) 200 - 950 cells/uL    Eosinophils (Absolute) 130 15 - 500 cells/uL    Basophils ABS 28 0 - 200 cells/uL    Neutrophils 63.6 %    Lymphocytes 23.0 %    Monocytes 11.7 %    Eosinophils 1.4 %    Basophils PCT 0.3 %   TSH, 3rd generation    Collection Time: 12/30/23 11:04 AM   Result Value Ref Range    TSH 1.09 0.40 - 4.50 mIU/L   Vitamin B12    Collection Time: 12/30/23 11:04 AM   Result Value Ref Range    Vitamin B-12 720 200 - 1,100 pg/mL   Vitamin D 25 hydroxy    Collection Time: 12/30/23 11:04 AM   Result Value Ref Range    Vitamin D, 25-Hydroxy, Serum 18 (L) 30 - 100 ng/mL   Urinalysis with microscopic    Collection Time: 01/03/24  9:54 AM   Result Value Ref Range    Color UA YELLOW YELLOW    Urine Appearance CLEAR CLEAR    Specific Gravity 1.013 1.001 - 1.035    Ph 5.5 5.0 - 8.0    Glucose, Urine NEGATIVE NEGATIVE    Bilirubin, Urine NEGATIVE NEGATIVE    Ketone, Urine NEGATIVE NEGATIVE    Blood, Urine TRACE (A) NEGATIVE    Protein, Urine NEGATIVE NEGATIVE    Nitrites Urine NEGATIVE NEGATIVE    Leukocyte Esterase 2+ (A) NEGATIVE    SL AMB WBC, URINE 6-10 (A) < OR = 5 /HPF    RBC, Urine 0-2 < OR = 2 /HPF    Squamous Epithelial Cells NONE SEEN < OR = 5 /HPF     Bacteria, UA NONE SEEN NONE SEEN /HPF    Hyaline Casts NONE SEEN NONE SEEN /LPF    Comment(s)          Physical Exam  Constitutional:       General: She is not in acute distress.     Appearance: Normal appearance.   HENT:      Head: Normocephalic and atraumatic.      Nose: Nose normal.      Mouth/Throat:      Mouth: Mucous membranes are moist.   Eyes:      Extraocular Movements: Extraocular movements intact.      Pupils: Pupils are equal, round, and reactive to light.   Cardiovascular:      Rate and Rhythm: Normal rate and regular rhythm.      Pulses: Normal pulses.      Heart sounds: Normal heart sounds. No murmur heard.     No friction rub.   Pulmonary:      Effort: Pulmonary effort is normal. No respiratory distress.      Breath sounds: Normal breath sounds. No wheezing.   Abdominal:      General: Abdomen is flat. Bowel sounds are normal. There is no distension.      Palpations: Abdomen is soft. There is no mass.      Tenderness: There is no abdominal tenderness. There is no guarding.   Musculoskeletal:         General: Normal range of motion.      Cervical back: Normal range of motion.   Neurological:      General: No focal deficit present.      Mental Status: She is alert. Mental status is at baseline.      Cranial Nerves: No cranial nerve deficit.   Psychiatric:         Mood and Affect: Mood normal.         Behavior: Behavior normal.

## 2023-12-31 LAB
25(OH)D3 SERPL-MCNC: 18 NG/ML (ref 30–100)
ALBUMIN SERPL-MCNC: 4.4 G/DL (ref 3.6–5.1)
ALBUMIN/GLOB SERPL: 1.5 (CALC) (ref 1–2.5)
ALP SERPL-CCNC: 64 U/L (ref 37–153)
ALT SERPL-CCNC: 20 U/L (ref 6–29)
AST SERPL-CCNC: 22 U/L (ref 10–35)
BASOPHILS # BLD AUTO: 28 CELLS/UL (ref 0–200)
BASOPHILS NFR BLD AUTO: 0.3 %
BILIRUB SERPL-MCNC: 1 MG/DL (ref 0.2–1.2)
BUN SERPL-MCNC: 18 MG/DL (ref 7–25)
BUN/CREAT SERPL: NORMAL (CALC) (ref 6–22)
CALCIUM SERPL-MCNC: 9.7 MG/DL (ref 8.6–10.4)
CHLORIDE SERPL-SCNC: 103 MMOL/L (ref 98–110)
CO2 SERPL-SCNC: 30 MMOL/L (ref 20–32)
CREAT SERPL-MCNC: 0.66 MG/DL (ref 0.6–0.95)
EOSINOPHIL # BLD AUTO: 130 CELLS/UL (ref 15–500)
EOSINOPHIL NFR BLD AUTO: 1.4 %
ERYTHROCYTE [DISTWIDTH] IN BLOOD BY AUTOMATED COUNT: 12.3 % (ref 11–15)
GFR/BSA.PRED SERPLBLD CYS-BASED-ARV: 87 ML/MIN/1.73M2
GLOBULIN SER CALC-MCNC: 2.9 G/DL (CALC) (ref 1.9–3.7)
GLUCOSE SERPL-MCNC: 83 MG/DL (ref 65–99)
HCT VFR BLD AUTO: 43.3 % (ref 35–45)
HGB BLD-MCNC: 14.4 G/DL (ref 11.7–15.5)
LYMPHOCYTES # BLD AUTO: 2139 CELLS/UL (ref 850–3900)
LYMPHOCYTES NFR BLD AUTO: 23 %
MCH RBC QN AUTO: 30.2 PG (ref 27–33)
MCHC RBC AUTO-ENTMCNC: 33.3 G/DL (ref 32–36)
MCV RBC AUTO: 90.8 FL (ref 80–100)
MONOCYTES # BLD AUTO: 1088 CELLS/UL (ref 200–950)
MONOCYTES NFR BLD AUTO: 11.7 %
NEUTROPHILS # BLD AUTO: 5915 CELLS/UL (ref 1500–7800)
NEUTROPHILS NFR BLD AUTO: 63.6 %
PLATELET # BLD AUTO: 207 THOUSAND/UL (ref 140–400)
PMV BLD REES-ECKER: 11.4 FL (ref 7.5–12.5)
POTASSIUM SERPL-SCNC: 4.1 MMOL/L (ref 3.5–5.3)
PROT SERPL-MCNC: 7.3 G/DL (ref 6.1–8.1)
RBC # BLD AUTO: 4.77 MILLION/UL (ref 3.8–5.1)
SODIUM SERPL-SCNC: 141 MMOL/L (ref 135–146)
TSH SERPL-ACNC: 1.09 MIU/L (ref 0.4–4.5)
VIT B12 SERPL-MCNC: 720 PG/ML (ref 200–1100)
WBC # BLD AUTO: 9.3 THOUSAND/UL (ref 3.8–10.8)

## 2024-01-03 LAB
APPEARANCE UR: CLEAR
BACTERIA UR QL AUTO: ABNORMAL /HPF
BILIRUB UR QL STRIP: NEGATIVE
COLOR UR: YELLOW
GLUCOSE UR QL STRIP: NEGATIVE
HGB UR QL STRIP: ABNORMAL
HYALINE CASTS #/AREA URNS LPF: ABNORMAL /LPF
KETONES UR QL STRIP: NEGATIVE
LEUKOCYTE ESTERASE UR QL STRIP: ABNORMAL
NITRITE UR QL STRIP: NEGATIVE
PH UR STRIP: 5.5 [PH] (ref 5–8)
PROT UR QL STRIP: NEGATIVE
RBC #/AREA URNS HPF: ABNORMAL /HPF
SP GR UR STRIP: 1.01 (ref 1–1.03)
SQUAMOUS #/AREA URNS HPF: ABNORMAL /HPF
WBC #/AREA URNS HPF: ABNORMAL /HPF

## 2024-01-31 DIAGNOSIS — F02.80 LATE ONSET ALZHEIMER'S DEMENTIA WITHOUT BEHAVIORAL DISTURBANCE (HCC): ICD-10-CM

## 2024-01-31 DIAGNOSIS — G30.1 LATE ONSET ALZHEIMER'S DEMENTIA WITHOUT BEHAVIORAL DISTURBANCE (HCC): ICD-10-CM

## 2024-01-31 RX ORDER — DONEPEZIL HYDROCHLORIDE 5 MG/1
5 TABLET, ORALLY DISINTEGRATING ORAL
Qty: 90 TABLET | Refills: 0 | Status: SHIPPED | OUTPATIENT
Start: 2024-01-31

## 2024-03-21 ENCOUNTER — NEW PATIENT (OUTPATIENT)
Dept: URBAN - METROPOLITAN AREA CLINIC 6 | Facility: CLINIC | Age: 84
End: 2024-03-21

## 2024-03-21 DIAGNOSIS — H02.132: ICD-10-CM

## 2024-03-21 DIAGNOSIS — H25.813: ICD-10-CM

## 2024-03-21 PROCEDURE — 92004 COMPRE OPH EXAM NEW PT 1/>: CPT

## 2024-03-21 ASSESSMENT — VISUAL ACUITY
OS_SC: 20/50
OD_SC: 20/40

## 2024-03-21 ASSESSMENT — TONOMETRY
OS_IOP_MMHG: 17
OD_IOP_MMHG: 17

## 2024-04-04 ENCOUNTER — OFFICE VISIT (OUTPATIENT)
Dept: INTERNAL MEDICINE CLINIC | Facility: CLINIC | Age: 84
End: 2024-04-04
Payer: MEDICARE

## 2024-04-04 VITALS
OXYGEN SATURATION: 97 % | SYSTOLIC BLOOD PRESSURE: 124 MMHG | BODY MASS INDEX: 18.61 KG/M2 | TEMPERATURE: 97.5 F | WEIGHT: 105 LBS | DIASTOLIC BLOOD PRESSURE: 76 MMHG | HEART RATE: 95 BPM | HEIGHT: 63 IN

## 2024-04-04 DIAGNOSIS — E55.9 VITAMIN D DEFICIENCY, UNSPECIFIED: ICD-10-CM

## 2024-04-04 DIAGNOSIS — F02.80 LATE ONSET ALZHEIMER'S DEMENTIA WITHOUT BEHAVIORAL DISTURBANCE (HCC): Primary | ICD-10-CM

## 2024-04-04 DIAGNOSIS — H01.016 ULCERATIVE BLEPHARITIS OF BOTH EYES, UNSPECIFIED EYELID: ICD-10-CM

## 2024-04-04 DIAGNOSIS — H01.013 ULCERATIVE BLEPHARITIS OF BOTH EYES, UNSPECIFIED EYELID: ICD-10-CM

## 2024-04-04 DIAGNOSIS — G30.1 LATE ONSET ALZHEIMER'S DEMENTIA WITHOUT BEHAVIORAL DISTURBANCE (HCC): Primary | ICD-10-CM

## 2024-04-04 DIAGNOSIS — M85.852 OSTEOPENIA OF NECK OF LEFT FEMUR: ICD-10-CM

## 2024-04-04 DIAGNOSIS — Z00.00 MEDICARE ANNUAL WELLNESS VISIT, SUBSEQUENT: ICD-10-CM

## 2024-04-04 PROCEDURE — G0439 PPPS, SUBSEQ VISIT: HCPCS | Performed by: INTERNAL MEDICINE

## 2024-04-04 PROCEDURE — 99214 OFFICE O/P EST MOD 30 MIN: CPT | Performed by: INTERNAL MEDICINE

## 2024-04-04 RX ORDER — ERYTHROMYCIN 5 MG/G
0.5 OINTMENT OPHTHALMIC EVERY 12 HOURS SCHEDULED
COMMUNITY
Start: 2024-03-21 | End: 2024-04-04 | Stop reason: SDUPTHER

## 2024-04-04 RX ORDER — ERYTHROMYCIN 5 MG/G
0.5 OINTMENT OPHTHALMIC EVERY 12 HOURS SCHEDULED
Qty: 3.5 G | Refills: 3 | Status: SHIPPED | OUTPATIENT
Start: 2024-04-04

## 2024-04-04 NOTE — PATIENT INSTRUCTIONS
Medicare Preventive Visit Patient Instructions  Thank you for completing your Welcome to Medicare Visit or Medicare Annual Wellness Visit today. Your next wellness visit will be due in one year (4/5/2025).  The screening/preventive services that you may require over the next 5-10 years are detailed below. Some tests may not apply to you based off risk factors and/or age. Screening tests ordered at today's visit but not completed yet may show as past due. Also, please note that scanned in results may not display below.  Preventive Screenings:  Service Recommendations Previous Testing/Comments   Colorectal Cancer Screening  * Colonoscopy    * Fecal Occult Blood Test (FOBT)/Fecal Immunochemical Test (FIT)  * Fecal DNA/Cologuard Test  * Flexible Sigmoidoscopy Age: 45-75 years old   Colonoscopy: every 10 years (may be performed more frequently if at higher risk)  OR  FOBT/FIT: every 1 year  OR  Cologuard: every 3 years  OR  Sigmoidoscopy: every 5 years  Screening may be recommended earlier than age 45 if at higher risk for colorectal cancer. Also, an individualized decision between you and your healthcare provider will decide whether screening between the ages of 76-85 would be appropriate. Colonoscopy: 09/27/2019  FOBT/FIT: Not on file  Cologuard: Not on file  Sigmoidoscopy: Not on file          Breast Cancer Screening Age: 40+ years old  Frequency: every 1-2 years  Not required if history of left and right mastectomy Mammogram: 06/14/2019        Cervical Cancer Screening Between the ages of 21-29, pap smear recommended once every 3 years.   Between the ages of 30-65, can perform pap smear with HPV co-testing every 5 years.   Recommendations may differ for women with a history of total hysterectomy, cervical cancer, or abnormal pap smears in past. Pap Smear: Not on file    Screening Not Indicated   Hepatitis C Screening Once for adults born between 1945 and 1965  More frequently in patients at high risk for Hepatitis C  Hep C Antibody: Not on file        Diabetes Screening 1-2 times per year if you're at risk for diabetes or have pre-diabetes Fasting glucose: No results in last 5 years (No results in last 5 years)  A1C: No results in last 5 years (No results in last 5 years)  Screening Current   Cholesterol Screening Once every 5 years if you don't have a lipid disorder. May order more often based on risk factors. Lipid panel: Not on file          Other Preventive Screenings Covered by Medicare:  Abdominal Aortic Aneurysm (AAA) Screening: covered once if your at risk. You're considered to be at risk if you have a family history of AAA.  Lung Cancer Screening: covers low dose CT scan once per year if you meet all of the following conditions: (1) Age 55-77; (2) No signs or symptoms of lung cancer; (3) Current smoker or have quit smoking within the last 15 years; (4) You have a tobacco smoking history of at least 20 pack years (packs per day multiplied by number of years you smoked); (5) You get a written order from a healthcare provider.  Glaucoma Screening: covered annually if you're considered high risk: (1) You have diabetes OR (2) Family history of glaucoma OR (3)  aged 50 and older OR (4)  American aged 65 and older  Osteoporosis Screening: covered every 2 years if you meet one of the following conditions: (1) You're estrogen deficient and at risk for osteoporosis based off medical history and other findings; (2) Have a vertebral abnormality; (3) On glucocorticoid therapy for more than 3 months; (4) Have primary hyperparathyroidism; (5) On osteoporosis medications and need to assess response to drug therapy.   Last bone density test (DXA Scan): 12/06/2022.  HIV Screening: covered annually if you're between the age of 15-65. Also covered annually if you are younger than 15 and older than 65 with risk factors for HIV infection. For pregnant patients, it is covered up to 3 times per  pregnancy.    Immunizations:  Immunization Recommendations   Influenza Vaccine Annual influenza vaccination during flu season is recommended for all persons aged >= 6 months who do not have contraindications   Pneumococcal Vaccine   * Pneumococcal conjugate vaccine = PCV13 (Prevnar 13), PCV15 (Vaxneuvance), PCV20 (Prevnar 20)  * Pneumococcal polysaccharide vaccine = PPSV23 (Pneumovax) Adults 19-63 yo with certain risk factors or if 65+ yo  If never received any pneumonia vaccine: recommend Prevnar 20 (PCV20)  Give PCV20 if previously received 1 dose of PCV13 or PPSV23   Hepatitis B Vaccine 3 dose series if at intermediate or high risk (ex: diabetes, end stage renal disease, liver disease)   Respiratory syncytial virus (RSV) Vaccine - COVERED BY MEDICARE PART D  * RSVPreF3 (Arexvy) CDC recommends that adults 60 years of age and older may receive a single dose of RSV vaccine using shared clinical decision-making (SCDM)   Tetanus (Td) Vaccine - COST NOT COVERED BY MEDICARE PART B Following completion of primary series, a booster dose should be given every 10 years to maintain immunity against tetanus. Td may also be given as tetanus wound prophylaxis.   Tdap Vaccine - COST NOT COVERED BY MEDICARE PART B Recommended at least once for all adults. For pregnant patients, recommended with each pregnancy.   Shingles Vaccine (Shingrix) - COST NOT COVERED BY MEDICARE PART B  2 shot series recommended in those 19 years and older who have or will have weakened immune systems or those 50 years and older     Health Maintenance Due:  There are no preventive care reminders to display for this patient.  Immunizations Due:      Topic Date Due   • COVID-19 Vaccine (8 - 2023-24 season) 12/07/2023     Advance Directives   What are advance directives?  Advance directives are legal documents that state your wishes and plans for medical care. These plans are made ahead of time in case you lose your ability to make decisions for yourself.  Advance directives can apply to any medical decision, such as the treatments you want, and if you want to donate organs.   What are the types of advance directives?  There are many types of advance directives, and each state has rules about how to use them. You may choose a combination of any of the following:  Living will:  This is a written record of the treatment you want. You can also choose which treatments you do not want, which to limit, and which to stop at a certain time. This includes surgery, medicine, IV fluid, and tube feedings.   Durable power of  for healthcare (DPAHC):  This is a written record that states who you want to make healthcare choices for you when you are unable to make them for yourself. This person, called a proxy, is usually a family member or a friend. You may choose more than 1 proxy.  Do not resuscitate (DNR) order:  A DNR order is used in case your heart stops beating or you stop breathing. It is a request not to have certain forms of treatment, such as CPR. A DNR order may be included in other types of advance directives.  Medical directive:  This covers the care that you want if you are in a coma, near death, or unable to make decisions for yourself. You can list the treatments you want for each condition. Treatment may include pain medicine, surgery, blood transfusions, dialysis, IV or tube feedings, and a ventilator (breathing machine).  Values history:  This document has questions about your views, beliefs, and how you feel and think about life. This information can help others choose the care that you would choose.  Why are advance directives important?  An advance directive helps you control your care. Although spoken wishes may be used, it is better to have your wishes written down. Spoken wishes can be misunderstood, or not followed. Treatments may be given even if you do not want them. An advance directive may make it easier for your family to make difficult  choices about your care.   Fall Prevention    Fall prevention  includes ways to make your home and other areas safer. It also includes ways you can move more carefully to prevent a fall. Health conditions that cause changes in your blood pressure, vision, or muscle strength and coordination may increase your risk for falls. Medicines may also increase your risk for falls if they make you dizzy, weak, or sleepy.   Fall prevention tips:   Stand or sit up slowly.    Use assistive devices as directed.    Wear shoes that fit well and have soles that .    Wear a personal alarm.    Stay active.    Manage your medical conditions.    Home Safety Tips:  Add items to prevent falls in the bathroom.    Keep paths clear.    Install bright lights in your home.    Keep items you use often on shelves within reach.    Paint or place reflective tape on the edges of your stairs.       © Copyright [employer] 2018 Information is for End User's use only and may not be sold, redistributed or otherwise used for commercial purposes. All illustrations and images included in CareNotes® are the copyrighted property of [employer] Inc[employer] or [employer]

## 2024-04-04 NOTE — PROGRESS NOTES
Assessment and Plan:     Problem List Items Addressed This Visit    None  Visit Diagnoses       Late onset Alzheimer's dementia without behavioral disturbance (HCC)    -  Primary    Continue donepezil.   assists with household work.    Relevant Orders    CBC and differential    Comprehensive metabolic panel    Urinalysis with microscopic    Ulcerative blepharitis of both eyes, unspecified eyelid        Follows with ophthalmology.  Continue E-Mycin ointment    Relevant Medications    erythromycin (ILOTYCIN) ophthalmic ointment    Vitamin D deficiency, unspecified        Continue supplementation.    Medicare annual wellness visit, subsequent        Osteopenia of neck of left femur        Continue calcium and vitamin D.  DEXA scan will be done in December.             1. Late onset Alzheimer's dementia without behavioral disturbance (HCC)  CBC and differential    Comprehensive metabolic panel    Urinalysis with microscopic    Continue donepezil.   assists with household work.      2. Ulcerative blepharitis of both eyes, unspecified eyelid  erythromycin (ILOTYCIN) ophthalmic ointment    Follows with ophthalmology.  Continue E-Mycin ointment      3. Vitamin D deficiency, unspecified      Continue supplementation.      4. Medicare annual wellness visit, subsequent        5. Osteopenia of neck of left femur      Continue calcium and vitamin D.  DEXA scan will be done in December.         Preventive health issues were discussed with patient, and age appropriate screening tests were ordered as noted in patient's After Visit Summary.  Personalized health advice and appropriate referrals for health education or preventive services given if needed, as noted in patient's After Visit Summary.     History of Present Illness:     Patient presents for a Medicare Wellness Visit    HPI   Patient Care Team:  Leopoldo Eagle MD as PCP - General (Internal Medicine)     Review of Systems:     Review of Systems    Constitutional:  Negative for appetite change, chills, fatigue and fever.   HENT:  Negative for sore throat and trouble swallowing.    Eyes:  Negative for redness.   Respiratory:  Negative for shortness of breath.    Cardiovascular:  Negative for chest pain and palpitations.   Gastrointestinal:  Negative for abdominal pain, constipation and diarrhea.   Genitourinary:  Negative for dysuria and hematuria.   Musculoskeletal:  Negative for back pain and neck pain.   Skin:  Negative for rash.   Neurological:  Negative for seizures, weakness and headaches.        Memory loss   Hematological:  Negative for adenopathy.   Psychiatric/Behavioral:  The patient is not nervous/anxious.         Problem List:     Patient Active Problem List   Diagnosis    Gastroesophageal reflux disease without esophagitis    Vitamin D deficiency    Vitamin B12 deficiency    Allergies    Dementia (HCC)    Mild protein-calorie malnutrition (HCC)    Nasal congestion    Hypertension, essential      Past Medical and Surgical History:     Past Medical History:   Diagnosis Date    Esophageal dilatation     Gastroesophageal reflux disease     Hypertension, essential     Other abnormal and inconclusive findings on diagnostic imaging of breast     Other rosacea     Palmar fascial fibromatosis     Vitamin D deficiency      Past Surgical History:   Procedure Laterality Date    COLONOSCOPY  12/21/2011    TUBAL LIGATION  1976      Family History:     Family History   Problem Relation Age of Onset    Stroke Mother     Stroke Father     No Known Problems Maternal Grandmother     No Known Problems Maternal Grandfather     No Known Problems Paternal Grandmother     Colon cancer Paternal Grandfather 60    Prostate cancer Brother 72    No Known Problems Maternal Aunt     No Known Problems Paternal Aunt     No Known Problems Paternal Aunt     No Known Problems Paternal Aunt     No Known Problems Paternal Aunt     Prostate cancer Family     Hypertension Family        Social History:     Social History     Socioeconomic History    Marital status: /Civil Union     Spouse name: None    Number of children: None    Years of education: None    Highest education level: None   Occupational History    Occupation: Retired   Tobacco Use    Smoking status: Never    Smokeless tobacco: Never   Vaping Use    Vaping status: Never Used   Substance and Sexual Activity    Alcohol use: Yes     Comment: 1 glass of wine a week    Drug use: Not Currently    Sexual activity: None   Other Topics Concern    None   Social History Narrative    Alcohol: Negative - As per [employer]     Social Determinants of Health     Financial Resource Strain: Low Risk  (3/14/2023)    Overall Financial Resource Strain (CARDIA)     Difficulty of Paying Living Expenses: Not hard at all   Food Insecurity: No Food Insecurity (4/4/2024)    Hunger Vital Sign     Worried About Running Out of Food in the Last Year: Never true     Ran Out of Food in the Last Year: Never true   Transportation Needs: No Transportation Needs (4/4/2024)    PRAPARE - Transportation     Lack of Transportation (Medical): No     Lack of Transportation (Non-Medical): No   Physical Activity: Not on file   Stress: Not on file   Social Connections: Not on file   Intimate Partner Violence: Not on file   Housing Stability: Low Risk  (4/4/2024)    Housing Stability Vital Sign     Unable to Pay for Housing in the Last Year: No     Number of Places Lived in the Last Year: 1     Unstable Housing in the Last Year: No      Medications and Allergies:     Current Outpatient Medications   Medication Sig Dispense Refill    b complex vitamins capsule Take 1 capsule by mouth daily      cholecalciferol (VITAMIN D3) 1,000 units tablet Take 2,000 Units by mouth daily      donepezil (ARICEPT ODT) 5 MG disintegrating tablet Take 1 tablet (5 mg total) by mouth daily at bedtime 90 tablet 0    erythromycin (ILOTYCIN) ophthalmic ointment Administer 0.5 inches to the  right eye every 12 (twelve) hours 3.5 g 3    fluticasone (FLONASE) 50 mcg/act nasal spray 1 spray into each nostril daily 18.2 mL 1    vitamin B-12 (CYANOCOBALAMIN) 250 MCG tablet Take 250 mcg by mouth daily      Cholecalciferol (VITAMIN D-3 PO)  (Patient not taking: Reported on 10/26/2023)      Cyanocobalamin (VITAMIN B-12 PO) Vitamin B-12 (Patient not taking: Reported on 10/26/2023)      famotidine (PEPCID) 10 mg tablet Take 10 mg by mouth if needed (Patient not taking: Reported on 6/20/2023)       No current facility-administered medications for this visit.     Allergies   Allergen Reactions    Dust Mite Extract Shortness Of Breath    Aspirin     Bactrim [Sulfamethoxazole-Trimethoprim]     Pollen Extract Other (See Comments)    Proton Pump Inhibitors       Immunizations:     Immunization History   Administered Date(s) Administered    COVID-19 Moderna mRNA Vaccine 12 Yr+ 50 mcg/0.5 mL (Spikevax) 10/12/2023    COVID-19 PFIZER VACCINE 0.3 ML IM 01/26/2021, 02/14/2021, 09/05/2021, 09/25/2021, 05/03/2022, 09/27/2022    INFLUENZA 09/01/2021    Influenza Injectable, MDCK, Preservative Free, Quadrivalent, 0.5 mL 09/14/2020    Influenza, injectable, quadrivalent, preservative free 0.5 mL 11/08/2022    Pneumococcal Conjugate 13-Valent 05/04/2015, 05/04/2015    Tdap 07/21/2020    Zoster 04/19/2022      Health Maintenance:     There are no preventive care reminders to display for this patient.      Topic Date Due    COVID-19 Vaccine (8 - 2023-24 season) 12/07/2023      Medicare Screening Tests and Risk Assessments:     Tiffany is here for her Subsequent Wellness visit. Last Medicare Wellness visit information reviewed, patient interviewed and updates made to the record today.      Health Risk Assessment:   Patient rates overall health as excellent. Patient feels that their physical health rating is same. Patient is very satisfied with their life. Eyesight was rated as same. Hearing was rated as same. Patient feels that  their emotional and mental health rating is same. Patients states they are sometimes angry. Patient states they are never, rarely unusually tired/fatigued. Pain experienced in the last 7 days has been none. Patient states that she has experienced no weight loss or gain in last 6 months.     Depression Screening:   PHQ-2 Score: 0      Fall Risk Screening:   In the past year, patient has experienced: history of falling in past year    Number of falls: 1  Injured during fall?: No    Feels unsteady when standing or walking?: No    Worried about falling?: Yes      Urinary Incontinence Screening:   Patient has not leaked urine accidently in the last six months.     Home Safety:  Patient does not have trouble with stairs inside or outside of their home. Patient has working smoke alarms and has working carbon monoxide detector. Home safety hazards include: none.     Nutrition:   Current diet is Regular.     Medications:   Patient is currently taking over-the-counter supplements. OTC medications include: see medication list. Patient is able to manage medications.     Activities of Daily Living (ADLs)/Instrumental Activities of Daily Living (IADLs):   Walk and transfer into and out of bed and chair?: Yes  Dress and groom yourself?: Yes    Bathe or shower yourself?: Yes    Feed yourself? Yes  Do your laundry/housekeeping?: Yes  Manage your money, pay your bills and track your expenses?: Yes  Make your own meals?: Yes    Do your own shopping?: Yes    Previous Hospitalizations:   Any hospitalizations or ED visits within the last 12 months?: No      Advance Care Planning:   Living will: Yes    Durable POA for healthcare: Yes    Advanced directive: Yes      PREVENTIVE SCREENINGS        Diabetes Screening:     General: Screening Current      Cervical Cancer Screening:    General: Screening Not Indicated      Lung Cancer Screening:     General: Screening Not Indicated    Screening, Brief Intervention, and Referral to Treatment  "(SBIRT)    Screening  Typical number of drinks in a day: 0  Typical number of drinks in a week: 0  Interpretation: Low risk drinking behavior.    Single Item Drug Screening:  How often have you used an illegal drug (including marijuana) or a prescription medication for non-medical reasons in the past year? never    Single Item Drug Screen Score: 0  Interpretation: Negative screen for possible drug use disorder    Other Counseling Topics:   Car/seat belt/driving safety, skin self-exam, sunscreen and calcium and vitamin D intake and regular weightbearing exercise.     No results found.     Physical Exam:     /76 (BP Location: Left arm, Patient Position: Sitting, Cuff Size: Standard)   Pulse 95   Temp 97.5 °F (36.4 °C) (Temporal)   Ht 5' 3\" (1.6 m)   Wt 47.6 kg (105 lb)   SpO2 97%   BMI 18.60 kg/m²     Physical Exam  Vitals and nursing note reviewed.   Constitutional:       General: She is not in acute distress.     Appearance: She is well-developed.   HENT:      Head: Normocephalic and atraumatic.   Eyes:      Conjunctiva/sclera: Conjunctivae normal.      Pupils: Pupils are equal, round, and reactive to light.      Comments: Chronic ectropion, blepharitis   Cardiovascular:      Rate and Rhythm: Normal rate and regular rhythm.      Heart sounds: No murmur heard.  Pulmonary:      Effort: Pulmonary effort is normal. No respiratory distress.      Breath sounds: Normal breath sounds.   Abdominal:      Palpations: Abdomen is soft.      Tenderness: There is no abdominal tenderness.   Musculoskeletal:         General: No swelling.      Cervical back: Neck supple.   Skin:     General: Skin is warm and dry.      Capillary Refill: Capillary refill takes less than 2 seconds.   Neurological:      Mental Status: She is alert.   Psychiatric:         Mood and Affect: Mood normal.          Leopoldo Eagle MD  "

## 2024-04-26 ENCOUNTER — OFFICE VISIT (OUTPATIENT)
Age: 84
End: 2024-04-26
Payer: MEDICARE

## 2024-04-26 VITALS
SYSTOLIC BLOOD PRESSURE: 118 MMHG | HEIGHT: 63 IN | DIASTOLIC BLOOD PRESSURE: 70 MMHG | WEIGHT: 112.6 LBS | OXYGEN SATURATION: 98 % | HEART RATE: 66 BPM | TEMPERATURE: 97.7 F | BODY MASS INDEX: 19.95 KG/M2

## 2024-04-26 DIAGNOSIS — E44.1 MILD PROTEIN-CALORIE MALNUTRITION (HCC): ICD-10-CM

## 2024-04-26 DIAGNOSIS — R47.89 WORD FINDING DIFFICULTY: ICD-10-CM

## 2024-04-26 DIAGNOSIS — I10 HYPERTENSION, ESSENTIAL: ICD-10-CM

## 2024-04-26 DIAGNOSIS — F03.90 DEMENTIA, UNSPECIFIED DEMENTIA SEVERITY, UNSPECIFIED DEMENTIA TYPE, UNSPECIFIED WHETHER BEHAVIORAL, PSYCHOTIC, OR MOOD DISTURBANCE OR ANXIETY (HCC): Primary | ICD-10-CM

## 2024-04-26 PROCEDURE — G2211 COMPLEX E/M VISIT ADD ON: HCPCS | Performed by: NURSE PRACTITIONER

## 2024-04-26 PROCEDURE — 99214 OFFICE O/P EST MOD 30 MIN: CPT | Performed by: NURSE PRACTITIONER

## 2024-04-26 NOTE — ASSESSMENT & PLAN NOTE
BP stable without ha/dizziness  Pt does not take any med for htn  Cont dietary and lifestyle interventions  Cont reg f/u with pcp for chronic management.

## 2024-04-26 NOTE — PROGRESS NOTES
Assessment & Plan:   1. Dementia, unspecified dementia severity, unspecified dementia type, unspecified whether behavioral, psychotic, or mood disturbance or anxiety (HCC)  Assessment & Plan:  MOCA:  previous <10, not completed.  Oriented to person, mostly tps.  ID 3/3 (could name one, but knew function of other 2).  Recall 1/3 (other 2 with hints).  Significant word finding issues  Per pt/, no major changes in cognition or function, no safety concerns at present.  Pt is independent adl/dependent iadls.  Lives with .  Family supportive, but do not live locally.  Pt's current cognitive level is consistent with mild dementia  Memory medications: aricept cont.  Occ bad dreams, occ gi upset - pt skips med x1 if experiencing symptoms, then restarts  Mobility:  no AD, no recent falls  Continue to stay active.  Current activity level:  fair.  Participates in social, cognitive, physical activity.  Monitor for changes in mood, sleep, pain control.  Notify provider if any concerns  Medication review:  seems appropriate for current conditions.  Check with pharmacist/provider before starting any new OTC/prescription medications for potential cognitive side effects  Optimize all acute and chronic conditions.  Continue to follow-up with PCP and specialist as directed for management  Safety concerns:  none per pt/  Caregiver stress:  none, no SW needs identified  Will cont to monitor need for assistance in home as pt's  is in his 89th year and their children are out of state.   Ensure adequate hydration, good nutrition  Recommended next follow up:  6 mo for cont cog/function eval        2. Mild protein-calorie malnutrition (HCC)  Assessment & Plan:  Pt weight up 8lb in 6 months  Cont good diet and hydration  Cont monitoring.      3. Hypertension, essential  Assessment & Plan:  BP stable without ha/dizziness  Pt does not take any med for htn  Cont dietary and lifestyle interventions  Cont reg f/u with  pcp for chronic management.      4. Word finding difficulty  Assessment & Plan:  Chronic issue, able to visualize words but not say all of them  Pt is able to make needs known  Discussed with pt/:  if word finding becomes more troublesome, could refer to ST to assist as needed.  Take time to think of words.  Consider use of picture board if needed.            HPI:  We had the pleasure of evaluating Tiffany Brizuela who is a 83 y.o. female in Geriatric follow up today.  Previous MOCA:  <10.  Comorbidities include dementia, htn,   She lives with her   Ms. Brizuela is in the office with her .     Memory update per patient/family:  Things are going well. Son in visiting from [place], has been here for a couple of weeks.  feels memory is stable currently. No new safety concerns. Pt has word finding issues, but is able to make needs known and participate in conversation.  Current activities:  Cognitive:  watches tv mostly Physical: occasionally walking with her   Social: weekly session with friends   She has difficulty finding the right word while speaking: yes  Patient requires repeat information or ask the same question repeatedly: No  Do you do your own bathing, dressing, feeding yourself Yes  Can you make your own meals and do light cleaning/chores No  Do you take care of your own medications Yes  Do you drive: No       Do you handle your own financial affairs such as balancing your checkbook, paying bills, investments: No  Have you or your family noted any change in your mood or personality:No  Are you currently or have you been treated in the past for depression or anxiety: No  Have you noticed any gait or balance disorder: No  Uses : no AD  Any hallucination or delusion: No  Fluctuation in alertness: No  Sleep Issues: No  Urinary/Stool Incontinence: No  Hearing and vision issue: No, had recent eye doctor visit  ADL/IADL:  independent/dependent  Appetite/swallow: good/no issues  Pain:  no      Family Review of Behavior [facility]:    pacing. No    agressive/combative behavior. No    agitated. No   wandering. No   resistance to care. No   hoarding/hiding objects.No    suspicious  No  withdrawn No    misplacing/losing objects Yes  personal hygiene problems.No  forgetfulness of actions Yes    ROS: Review of Systems   Constitutional:  Negative for activity change, appetite change, chills and fatigue.   HENT:  Negative for congestion and hearing loss.    Eyes:  Negative for visual disturbance.   Respiratory:  Negative for cough and shortness of breath.    Cardiovascular:  Negative for chest pain.   Gastrointestinal:  Negative for abdominal pain, constipation, diarrhea, nausea and vomiting.   Genitourinary:  Negative for difficulty urinating.   Musculoskeletal:  Negative for arthralgias, back pain and gait problem.   Neurological:  Negative for dizziness and light-headedness.   Psychiatric/Behavioral:  Positive for decreased concentration (forgetful). Negative for dysphoric mood and sleep disturbance. The patient is not nervous/anxious.        Past Medical, surgical, social, medication and allergy history and patients previous records reviewed.  Allergies:   Allergies   Allergen Reactions    Dust Mite Extract Shortness Of Breath    Aspirin     Bactrim [Sulfamethoxazole-Trimethoprim]     Pollen Extract Other (See Comments)    Proton Pump Inhibitors        Medications:      Current Outpatient Medications:     b complex vitamins capsule, Take 1 capsule by mouth daily, Disp: , Rfl:     cholecalciferol (VITAMIN D3) 1,000 units tablet, Take 2,000 Units by mouth daily, Disp: , Rfl:     donepezil (ARICEPT ODT) 5 MG disintegrating tablet, Take 1 tablet (5 mg total) by mouth daily at bedtime, Disp: 90 tablet, Rfl: 0    erythromycin (ILOTYCIN) ophthalmic ointment, Administer 0.5 inches to the right eye every 12 (twelve) hours, Disp: 3.5 g, Rfl: 3    famotidine (PEPCID) 10 mg tablet, Take 10 mg by mouth if needed,  Disp: , Rfl:     fluticasone (FLONASE) 50 mcg/act nasal spray, 1 spray into each nostril daily, Disp: 18.2 mL, Rfl: 1    vitamin B-12 (CYANOCOBALAMIN) 250 MCG tablet, Take 250 mcg by mouth daily, Disp: , Rfl:     Cholecalciferol (VITAMIN D-3 PO), , Disp: , Rfl:     Cyanocobalamin (VITAMIN B-12 PO), Vitamin B-12 (Patient not taking: Reported on 4/26/2024), Disp: , Rfl:     Vitals:  Vitals:    04/26/24 0922   BP: 118/70   Pulse: 66   Temp: 97.7 °F (36.5 °C)   SpO2: 98%       History:  Past Medical History:   Diagnosis Date    Esophageal dilatation     Gastroesophageal reflux disease     Hypertension, essential     Other abnormal and inconclusive findings on diagnostic imaging of breast     Other rosacea     Palmar fascial fibromatosis     Vitamin D deficiency      Past Surgical History:   Procedure Laterality Date    COLONOSCOPY  12/21/2011    TUBAL LIGATION  1976     Family History   Problem Relation Age of Onset    Stroke Mother     Stroke Father     No Known Problems Maternal Grandmother     No Known Problems Maternal Grandfather     No Known Problems Paternal Grandmother     Colon cancer Paternal Grandfather 60    Prostate cancer Brother 72    No Known Problems Maternal Aunt     No Known Problems Paternal Aunt     No Known Problems Paternal Aunt     No Known Problems Paternal Aunt     No Known Problems Paternal Aunt     Prostate cancer Family     Hypertension Family      Social History     Socioeconomic History    Marital status: /Civil Union     Spouse name: Not on file    Number of children: Not on file    Years of education: Not on file    Highest education level: Not on file   Occupational History    Occupation: Retired   Tobacco Use    Smoking status: Never    Smokeless tobacco: Never   Vaping Use    Vaping status: Never Used   Substance and Sexual Activity    Alcohol use: Yes     Comment: 1 glass of wine a week    Drug use: Not Currently    Sexual activity: Not on file   Other Topics Concern    Not on  file   Social History Narrative    Alcohol: Negative - As per [employer]     Social Determinants of Health     Financial Resource Strain: Low Risk  (3/14/2023)    Overall Financial Resource Strain (CARDIA)     Difficulty of Paying Living Expenses: Not hard at all   Food Insecurity: No Food Insecurity (4/4/2024)    Hunger Vital Sign     Worried About Running Out of Food in the Last Year: Never true     Ran Out of Food in the Last Year: Never true   Transportation Needs: No Transportation Needs (4/4/2024)    PRAPARE - Transportation     Lack of Transportation (Medical): No     Lack of Transportation (Non-Medical): No   Physical Activity: Not on file   Stress: Not on file   Social Connections: Not on file   Intimate Partner Violence: Not on file   Housing Stability: Low Risk  (4/4/2024)    Housing Stability Vital Sign     Unable to Pay for Housing in the Last Year: No     Number of Places Lived in the Last Year: 1     Unstable Housing in the Last Year: No     Past Surgical History:   Procedure Laterality Date    COLONOSCOPY  12/21/2011    TUBAL LIGATION  1976         Physical Exam:  Observed ambulation:  no AD, fairly steady, sl slower   Physical Exam  Vitals and nursing note reviewed.   Constitutional:       General: She is not in acute distress.     Appearance: Normal appearance. She is well-developed. She is not diaphoretic.   HENT:      Head: Normocephalic.   Cardiovascular:      Rate and Rhythm: Normal rate.      Heart sounds: No murmur heard.     No friction rub. No gallop.   Pulmonary:      Effort: Pulmonary effort is normal. No respiratory distress.      Breath sounds: Normal breath sounds. No wheezing or rales.   Abdominal:      General: Bowel sounds are normal. There is no distension.      Palpations: Abdomen is soft.      Tenderness: There is no abdominal tenderness. There is no rebound.   Musculoskeletal:         General: Normal range of motion.   Skin:     General: Skin is warm and dry.   Neurological:       General: No focal deficit present.      Mental Status: She is alert. Mental status is at baseline.      Comments: Oriented to person, mostly tps  Word finding issues  Pleasant, cooperative, forgetful   Psychiatric:         Mood and Affect: Mood normal.         Behavior: Behavior normal.

## 2024-04-26 NOTE — ASSESSMENT & PLAN NOTE
MOCA:  previous <10, not completed.  Oriented to person, mostly tps.  ID 3/3 (could name one, but knew function of other 2).  Recall 1/3 (other 2 with hints).  Significant word finding issues  Per pt/, no major changes in cognition or function, no safety concerns at present.  Pt is independent adl/dependent iadls.  Lives with .  Family supportive, but do not live locally.  Pt's current cognitive level is consistent with mild dementia  Memory medications: aricept cont.  Occ bad dreams, occ gi upset - pt skips med x1 if experiencing symptoms, then restarts  Mobility:  no AD, no recent falls  Continue to stay active.  Current activity level:  fair.  Participates in social, cognitive, physical activity.  Monitor for changes in mood, sleep, pain control.  Notify provider if any concerns  Medication review:  seems appropriate for current conditions.  Check with pharmacist/provider before starting any new OTC/prescription medications for potential cognitive side effects  Optimize all acute and chronic conditions.  Continue to follow-up with PCP and specialist as directed for management  Safety concerns:  none per pt/  Caregiver stress:  none, no SW needs identified  Will cont to monitor need for assistance in home as pt's  is in his 89th year and their children are out of state.   Ensure adequate hydration, good nutrition  Recommended next follow up:  6 mo for cont cog/function eval

## 2024-05-08 DIAGNOSIS — G30.1 LATE ONSET ALZHEIMER'S DEMENTIA WITHOUT BEHAVIORAL DISTURBANCE (HCC): ICD-10-CM

## 2024-05-08 DIAGNOSIS — F02.80 LATE ONSET ALZHEIMER'S DEMENTIA WITHOUT BEHAVIORAL DISTURBANCE (HCC): ICD-10-CM

## 2024-05-09 RX ORDER — DONEPEZIL HYDROCHLORIDE 5 MG/1
TABLET, ORALLY DISINTEGRATING ORAL
Qty: 90 TABLET | Refills: 1 | Status: SHIPPED | OUTPATIENT
Start: 2024-05-09

## 2024-06-25 ENCOUNTER — RA CDI HCC (OUTPATIENT)
Dept: OTHER | Facility: HOSPITAL | Age: 84
End: 2024-06-25

## 2024-07-02 ENCOUNTER — OFFICE VISIT (OUTPATIENT)
Dept: INTERNAL MEDICINE CLINIC | Facility: CLINIC | Age: 84
End: 2024-07-02
Payer: MEDICARE

## 2024-07-02 VITALS
TEMPERATURE: 98.1 F | HEIGHT: 63 IN | OXYGEN SATURATION: 97 % | WEIGHT: 109 LBS | HEART RATE: 85 BPM | BODY MASS INDEX: 19.31 KG/M2 | DIASTOLIC BLOOD PRESSURE: 72 MMHG | SYSTOLIC BLOOD PRESSURE: 112 MMHG

## 2024-07-02 DIAGNOSIS — H61.23 CERUMEN DEBRIS ON TYMPANIC MEMBRANE OF BOTH EARS: ICD-10-CM

## 2024-07-02 DIAGNOSIS — M85.852 OSTEOPENIA OF NECK OF LEFT FEMUR: ICD-10-CM

## 2024-07-02 DIAGNOSIS — K21.9 GASTROESOPHAGEAL REFLUX DISEASE WITHOUT ESOPHAGITIS: ICD-10-CM

## 2024-07-02 DIAGNOSIS — F02.80 LATE ONSET ALZHEIMER'S DEMENTIA WITHOUT BEHAVIORAL DISTURBANCE (HCC): Primary | ICD-10-CM

## 2024-07-02 DIAGNOSIS — G30.1 LATE ONSET ALZHEIMER'S DEMENTIA WITHOUT BEHAVIORAL DISTURBANCE (HCC): Primary | ICD-10-CM

## 2024-07-02 PROCEDURE — G2211 COMPLEX E/M VISIT ADD ON: HCPCS | Performed by: INTERNAL MEDICINE

## 2024-07-02 PROCEDURE — 99214 OFFICE O/P EST MOD 30 MIN: CPT | Performed by: INTERNAL MEDICINE

## 2024-07-02 NOTE — PROGRESS NOTES
"Ambulatory Visit  Name: Tiffany Brizuela      : 1940      MRN: [id number]  Encounter Provider: Leopoldo Eagle MD  Encounter Date: 2024   Encounter department: [facility]    Assessment & Plan   1. Late onset Alzheimer's dementia without behavioral disturbance (HCC)  Comments:  Continue donepezil and monitoring.  2. Gastroesophageal reflux disease without esophagitis  Comments:  Continue Pepcid.  3. Cerumen debris on tympanic membrane of both ears  Comments:  Use over-the-counter Debrox drops.  4. Osteopenia of neck of left femur  Comments:  Continue calcium and vitamin D.       History of Present Illness     Patient is an 84-year-old female with a history of dementia, gastroesophageal reflux disease, and hypertension.  She presents today for a follow-up.  She states that her eyes are watery due to seasonal allergies.        Review of Systems   Constitutional:  Negative for chills and fever.   HENT:  Negative for ear pain and sore throat.    Eyes:  Positive for discharge. Negative for pain and visual disturbance.        Watery eyes   Respiratory:  Negative for cough and shortness of breath.    Cardiovascular:  Negative for chest pain and palpitations.   Gastrointestinal:  Negative for abdominal pain and vomiting.   Genitourinary:  Negative for dysuria and hematuria.   Musculoskeletal:  Negative for arthralgias and back pain.   Skin:  Negative for color change and rash.   Neurological:  Negative for seizures and syncope.   All other systems reviewed and are negative.      Objective     /72 (BP Location: Left arm, Patient Position: Sitting, Cuff Size: Standard)   Pulse 85   Temp 98.1 °F (36.7 °C) (Temporal)   Ht 5' 3\" (1.6 m)   Wt 49.4 kg (109 lb)   SpO2 97%   BMI 19.31 kg/m²     Physical Exam  Vitals and nursing note reviewed.   Constitutional:       General: She is not in acute distress.     Appearance: She is well-developed.   HENT:      Head: " Normocephalic and atraumatic.      Mouth/Throat:      Mouth: Mucous membranes are moist.      Pharynx: Oropharynx is clear.   Eyes:      Conjunctiva/sclera: Conjunctivae normal.   Cardiovascular:      Rate and Rhythm: Normal rate and regular rhythm.      Heart sounds: No murmur heard.  Pulmonary:      Effort: Pulmonary effort is normal. No respiratory distress.      Breath sounds: Normal breath sounds.   Abdominal:      General: Abdomen is flat.      Palpations: Abdomen is soft.      Tenderness: There is no abdominal tenderness.   Musculoskeletal:         General: No swelling.      Cervical back: Normal range of motion and neck supple.   Skin:     General: Skin is warm and dry.      Capillary Refill: Capillary refill takes less than 2 seconds.   Neurological:      General: No focal deficit present.      Mental Status: She is alert and oriented to person, place, and time.   Psychiatric:         Mood and Affect: Mood normal.       Administrative Statements

## 2024-07-23 PROBLEM — G30.1 LATE ONSET ALZHEIMER DISEASE (HCC): Status: ACTIVE | Noted: 2024-07-23

## 2024-07-23 PROBLEM — G30.1 LATE ONSET ALZHEIMER DISEASE (HCC): Status: ACTIVE | Noted: 2021-11-23

## 2024-07-23 PROBLEM — F02.80 LATE ONSET ALZHEIMER DISEASE (HCC): Status: ACTIVE | Noted: 2024-07-23

## 2024-07-23 PROBLEM — F02.80 LATE ONSET ALZHEIMER DISEASE (HCC): Status: ACTIVE | Noted: 2021-11-23

## 2024-08-13 ENCOUNTER — OFFICE VISIT (OUTPATIENT)
Dept: INTERNAL MEDICINE CLINIC | Facility: CLINIC | Age: 84
End: 2024-08-13
Payer: MEDICARE

## 2024-08-13 VITALS
HEIGHT: 63 IN | SYSTOLIC BLOOD PRESSURE: 138 MMHG | BODY MASS INDEX: 19.17 KG/M2 | TEMPERATURE: 98.2 F | HEART RATE: 70 BPM | OXYGEN SATURATION: 96 % | DIASTOLIC BLOOD PRESSURE: 66 MMHG | WEIGHT: 108.2 LBS

## 2024-08-13 DIAGNOSIS — S09.90XA INJURY OF HEAD, INITIAL ENCOUNTER: ICD-10-CM
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
## 2024-08-13 DIAGNOSIS — G30.1 LATE ONSET ALZHEIMER'S DEMENTIA WITHOUT BEHAVIORAL DISTURBANCE (HCC): Primary | ICD-10-CM

## 2024-08-13 DIAGNOSIS — H01.016 ULCERATIVE BLEPHARITIS OF BOTH EYES, UNSPECIFIED EYELID: ICD-10-CM

## 2024-08-13 DIAGNOSIS — H01.013 ULCERATIVE BLEPHARITIS OF BOTH EYES, UNSPECIFIED EYELID: ICD-10-CM

## 2024-08-13 DIAGNOSIS — F02.80 LATE ONSET ALZHEIMER'S DEMENTIA WITHOUT BEHAVIORAL DISTURBANCE (HCC): Primary | ICD-10-CM

## 2024-08-13 DIAGNOSIS — K21.9 GASTROESOPHAGEAL REFLUX DISEASE WITHOUT ESOPHAGITIS: ICD-10-CM

## 2024-08-13 PROCEDURE — G2211 COMPLEX E/M VISIT ADD ON: HCPCS | Performed by: INTERNAL MEDICINE

## 2024-08-13 PROCEDURE — 99214 OFFICE O/P EST MOD 30 MIN: CPT | Performed by: INTERNAL MEDICINE

## 2024-08-13 RX ORDER — DONEPEZIL HYDROCHLORIDE 5 MG/1
5 TABLET, ORALLY DISINTEGRATING ORAL
Qty: 90 TABLET | Refills: 1 | Status: SHIPPED | OUTPATIENT
Start: 2024-08-13

## 2024-08-13 NOTE — PROGRESS NOTES
Assessment/Plan:           1. Late onset Alzheimer's dementia without behavioral disturbance (HCC)  Comments:  Slow progression.  Not using cane or walker.  Declines physical therapy.  Orders:  -     donepezil (ARICEPT ODT) 5 MG disintegrating tablet; Take 1 tablet (5 mg total) by mouth daily at bedtime  2. Fall, initial encounter  3. Gastroesophageal reflux disease without esophagitis  Comments:  Continue famotidine and caffeine free diet.  4. Ulcerative blepharitis of both eyes, unspecified eyelid  5. Injury of head, initial encounter  -     CT head wo contrast; Future; Expected date: 08/13/2024         1. Late onset Alzheimer's dementia without behavioral disturbance (HCC)    - donepezil (ARICEPT ODT) 5 MG disintegrating tablet; Take 1 tablet (5 mg total) by mouth daily at bedtime  Dispense: 90 tablet; Refill: 1    2. Fall, initial encounter      3. Gastroesophageal reflux disease without esophagitis      4. Ulcerative blepharitis of both eyes, unspecified eyelid         No problem-specific Assessment & Plan notes found for this encounter.           Subjective:      Patient ID: Tiffany Brizuela is a 84 y.o. female.    HPI    The following portions of the patient's history were reviewed and updated as appropriate: She  has a past medical history of Esophageal dilatation, Gastroesophageal reflux disease, Hypertension, essential, Other abnormal and inconclusive findings on diagnostic imaging of breast, Other rosacea, Palmar fascial fibromatosis, and Vitamin D deficiency.  She   Patient Active Problem List    Diagnosis Date Noted    Word finding difficulty 04/26/2024    Hypertension, essential     Nasal congestion 07/13/2022    Mild protein-calorie malnutrition (HCC) 03/08/2022    Late onset Alzheimer disease (HCC) 11/23/2021    Gastroesophageal reflux disease without esophagitis 09/14/2020    Vitamin D deficiency 09/14/2020    Vitamin B12 deficiency 09/14/2020    Allergies 09/14/2020     She  has a past surgical  history that includes Tubal ligation (1976) and Colonoscopy (12/21/2011).  Her family history includes Colon cancer (age of onset: 60) in her paternal grandfather; Hypertension in her family; No Known Problems in her maternal aunt, maternal grandfather, maternal grandmother, paternal aunt, paternal aunt, paternal aunt, paternal aunt, and paternal grandmother; Prostate cancer in her family; Prostate cancer (age of onset: 72) in her brother; Stroke in her father and mother.  She  reports that she has never smoked. She has never used smokeless tobacco. She reports current alcohol use. She reports that she does not currently use drugs.  Current Outpatient Medications   Medication Sig Dispense Refill    b complex vitamins capsule Take 1 capsule by mouth daily      cholecalciferol (VITAMIN D3) 1,000 units tablet Take 2,000 Units by mouth daily      Cyanocobalamin (VITAMIN B-12 PO)       donepezil (ARICEPT ODT) 5 MG disintegrating tablet Take 1 tablet (5 mg total) by mouth daily at bedtime 90 tablet 1    erythromycin (ILOTYCIN) ophthalmic ointment Administer 0.5 inches to the right eye every 12 (twelve) hours 3.5 g 3    famotidine (PEPCID) 10 mg tablet Take 10 mg by mouth if needed      fluticasone (FLONASE) 50 mcg/act nasal spray 1 spray into each nostril daily 18.2 mL 1    vitamin B-12 (CYANOCOBALAMIN) 250 MCG tablet Take 250 mcg by mouth daily      Cholecalciferol (VITAMIN D-3 PO)  (Patient not taking: Reported on 7/2/2024)       No current facility-administered medications for this visit.     Current Outpatient Medications on File Prior to Visit   Medication Sig    b complex vitamins capsule Take 1 capsule by mouth daily    cholecalciferol (VITAMIN D3) 1,000 units tablet Take 2,000 Units by mouth daily    Cyanocobalamin (VITAMIN B-12 PO)     erythromycin (ILOTYCIN) ophthalmic ointment Administer 0.5 inches to the right eye every 12 (twelve) hours    famotidine (PEPCID) 10 mg tablet Take 10 mg by mouth if needed     "fluticasone (FLONASE) 50 mcg/act nasal spray 1 spray into each nostril daily    vitamin B-12 (CYANOCOBALAMIN) 250 MCG tablet Take 250 mcg by mouth daily    [DISCONTINUED] donepezil (ARICEPT ODT) 5 MG disintegrating tablet DISSOLVE ONE TABLET BY MOUTH AT BEDTIME    Cholecalciferol (VITAMIN D-3 PO)  (Patient not taking: Reported on 7/2/2024)     No current facility-administered medications on file prior to visit.     Medications Discontinued During This Encounter   Medication Reason    donepezil (ARICEPT ODT) 5 MG disintegrating tablet Reorder      She is allergic to dust mite extract, aspirin, bactrim [sulfamethoxazole-trimethoprim], pollen extract, and proton pump inhibitors..    Review of Systems   Constitutional:  Negative for appetite change, chills, fatigue and fever.   HENT:  Negative for sore throat and trouble swallowing.    Eyes:  Positive for redness.   Respiratory:  Negative for shortness of breath.    Cardiovascular:  Negative for chest pain and palpitations.   Gastrointestinal:  Negative for abdominal pain, constipation and diarrhea.   Genitourinary:  Negative for dysuria and hematuria.   Musculoskeletal:  Positive for gait problem. Negative for back pain and neck pain.   Skin:  Negative for rash.   Neurological:  Negative for seizures, weakness and headaches.   Hematological:  Negative for adenopathy.   Psychiatric/Behavioral:  Negative for confusion. The patient is not nervous/anxious.          Objective:      /66 (BP Location: Left arm, Patient Position: Sitting, Cuff Size: Adult)   Pulse 70   Temp 98.2 °F (36.8 °C) (Temporal)   Ht 5' 3\" (1.6 m)   Wt 49.1 kg (108 lb 3.2 oz)   SpO2 96%   BMI 19.17 kg/m²     Results Reviewed       None            No results found for this or any previous visit (from the past 1344 hour(s)).     Physical Exam  Constitutional:       General: She is not in acute distress.     Appearance: Normal appearance.   HENT:      Head: Normocephalic and atraumatic.      " Nose: Nose normal.      Mouth/Throat:      Mouth: Mucous membranes are moist.   Eyes:      Extraocular Movements: Extraocular movements intact.      Pupils: Pupils are equal, round, and reactive to light.      Comments: Ectropion of eyelids   Cardiovascular:      Rate and Rhythm: Normal rate and regular rhythm.      Pulses: Normal pulses.      Heart sounds: Normal heart sounds. No murmur heard.     No friction rub.   Pulmonary:      Effort: Pulmonary effort is normal. No respiratory distress.      Breath sounds: Normal breath sounds. No wheezing.   Abdominal:      General: Abdomen is flat. Bowel sounds are normal. There is no distension.      Palpations: Abdomen is soft. There is no mass.      Tenderness: There is no abdominal tenderness. There is no guarding.   Musculoskeletal:         General: Normal range of motion.      Cervical back: Neck supple.   Neurological:      General: No focal deficit present.      Mental Status: She is alert. Mental status is at baseline.      Cranial Nerves: No cranial nerve deficit.      Gait: Gait abnormal.   Psychiatric:         Mood and Affect: Mood normal.         Behavior: Behavior normal.

## 2024-09-18 ENCOUNTER — TELEPHONE (OUTPATIENT)
Age: 84
End: 2024-09-18

## 2024-09-18 NOTE — TELEPHONE ENCOUNTER
Left message with Medical Director of [facility] to call back to confirm rescheduled appointment.    Patient was scheduled for 6 month f/u w/[facility] for 10/28/24.  Appointment was reschedule to next available of 2/13/25.

## 2024-09-18 NOTE — TELEPHONE ENCOUNTER
Spoke to son  Vernon and faxed to facility the information for the over the counter medications.

## 2024-09-18 NOTE — TELEPHONE ENCOUNTER
Son called to say patient now resides in LTC facility [facility].    Patient was scheduled for 6 month f/u w/[facility] for 10/28/24.  Appointment was reschedule to next available of 2/13/25.

## 2024-09-18 NOTE — TELEPHONE ENCOUNTER
Pt's son Vernon calling to advise Dr. Eagle and office that pt is now residing at the [facility] and they only support appts on Tuesdays and Thursdays. Advised the facility should be reaching out soon to reschedule some of the appts.     Stated that the facility wont let pt have OTC meds unless approved with script by Dr. Eagle. Vernon stated that pt takes women's rogaine was upset that they would not let her have it.    He is also requesting that Dr. Eagle specify on certain medications like the donepezil that it is to be taken at night as it affects pt's sleep. He mentioned that the facility did not administer the medication last night but gave to pt this AM and it affected her sleep and will possibly throw her off this evening when time to take again.    Please contact to advise.

## 2024-09-19 ENCOUNTER — TELEPHONE (OUTPATIENT)
Age: 84
End: 2024-09-19

## 2024-09-19 NOTE — TELEPHONE ENCOUNTER
Trenton from [facility] [employer] [facility] , asking if Dr Eagle could give some instructions For women's Roula 5% ,.. How often, how many time a day.     Call back# [phone] ask for Arlyn or trenton

## 2024-09-25 NOTE — TELEPHONE ENCOUNTER
Please be advised, our patient is new to the facility, and they did receive the previous order for Rogaine however they need the order to say Rogaine 2% to apply at bedtime daily.   Please update a new order and fax to [phone]  Thank you

## 2024-10-16 DIAGNOSIS — F02.80 LATE ONSET ALZHEIMER'S DEMENTIA WITHOUT BEHAVIORAL DISTURBANCE (HCC): ICD-10-CM

## 2024-10-16 DIAGNOSIS — H01.016 ULCERATIVE BLEPHARITIS OF BOTH EYES, UNSPECIFIED EYELID: ICD-10-CM

## 2024-10-16 DIAGNOSIS — R09.81 NASAL CONGESTION: ICD-10-CM

## 2024-10-16 DIAGNOSIS — G30.1 LATE ONSET ALZHEIMER'S DEMENTIA WITHOUT BEHAVIORAL DISTURBANCE (HCC): ICD-10-CM

## 2024-10-16 DIAGNOSIS — K21.9 GASTROESOPHAGEAL REFLUX DISEASE WITHOUT ESOPHAGITIS: Primary | ICD-10-CM

## 2024-10-16 DIAGNOSIS — H01.013 ULCERATIVE BLEPHARITIS OF BOTH EYES, UNSPECIFIED EYELID: ICD-10-CM

## 2024-10-16 RX ORDER — CHOLECALCIFEROL (VITAMIN D3) 25 MCG
2000 TABLET ORAL DAILY
Qty: 30 TABLET | Refills: 0 | Status: SHIPPED | OUTPATIENT
Start: 2024-10-16

## 2024-10-16 RX ORDER — DONEPEZIL HYDROCHLORIDE 5 MG/1
5 TABLET, ORALLY DISINTEGRATING ORAL
Qty: 90 TABLET | Refills: 1 | Status: SHIPPED | OUTPATIENT
Start: 2024-10-16

## 2024-10-16 RX ORDER — VITAMIN B COMPLEX
1 CAPSULE ORAL DAILY
Qty: 30 CAPSULE | Refills: 0 | Status: SHIPPED | OUTPATIENT
Start: 2024-10-16

## 2024-10-16 RX ORDER — FAMOTIDINE 10 MG
10 TABLET ORAL 2 TIMES DAILY PRN
Qty: 60 TABLET | Refills: 0 | Status: SHIPPED | OUTPATIENT
Start: 2024-10-16

## 2024-10-16 RX ORDER — FLUTICASONE PROPIONATE 50 MCG
1 SPRAY, SUSPENSION (ML) NASAL DAILY
Qty: 18.2 ML | Refills: 1 | Status: SHIPPED | OUTPATIENT
Start: 2024-10-16

## 2024-10-16 RX ORDER — CALCIUM CARBONATE/VITAMIN D3 600 MG-10
250 TABLET ORAL DAILY
Qty: 30 TABLET | Refills: 0 | Status: SHIPPED | OUTPATIENT
Start: 2024-10-16

## 2024-10-16 RX ORDER — ERYTHROMYCIN 5 MG/G
0.5 OINTMENT OPHTHALMIC EVERY 12 HOURS SCHEDULED
Qty: 3.5 G | Refills: 3 | Status: SHIPPED | OUTPATIENT
Start: 2024-10-16

## 2024-10-16 NOTE — TELEPHONE ENCOUNTER
Medications:  Rogain Women 5% at bedtime . 1 ml to scalp at bedtime.  b complex vitamins capsule     cholecalciferol (VITAMIN D3) 1,000 units tablet   4. donepezil (ARICEPT ODT) 5 MG disintegrating tablet   5. erythromycin (ILOTYCIN) ophthalmic ointment   6. famotidine (PEPCID) 10 mg tablet   7. fluticasone (FLONASE) 50 mcg/act nasal spray   8. vitamin B-12 (CYANOCOBALAMIN) 250 MCG tablet     Marianna calling from [facility] needs scripts sent over for these meds ASAP to [facility] .     Phone : [phone]

## 2024-10-22 ENCOUNTER — OFFICE VISIT (OUTPATIENT)
Dept: INTERNAL MEDICINE CLINIC | Facility: CLINIC | Age: 84
End: 2024-10-22
Payer: MEDICARE

## 2024-10-22 VITALS
SYSTOLIC BLOOD PRESSURE: 126 MMHG | BODY MASS INDEX: 19.84 KG/M2 | TEMPERATURE: 99.4 F | WEIGHT: 112 LBS | HEIGHT: 63 IN | DIASTOLIC BLOOD PRESSURE: 74 MMHG | OXYGEN SATURATION: 96 % | HEART RATE: 78 BPM

## 2024-10-22 DIAGNOSIS — F02.80 LATE ONSET ALZHEIMER'S DEMENTIA WITHOUT BEHAVIORAL DISTURBANCE (HCC): Primary | ICD-10-CM

## 2024-10-22 DIAGNOSIS — G30.1 LATE ONSET ALZHEIMER'S DEMENTIA WITHOUT BEHAVIORAL DISTURBANCE (HCC): Primary | ICD-10-CM

## 2024-10-22 DIAGNOSIS — H61.23 BILATERAL IMPACTED CERUMEN: ICD-10-CM

## 2024-10-22 DIAGNOSIS — H01.013 ULCERATIVE BLEPHARITIS OF BOTH EYES, UNSPECIFIED EYELID: ICD-10-CM

## 2024-10-22 DIAGNOSIS — Z23 ENCOUNTER FOR IMMUNIZATION: ICD-10-CM

## 2024-10-22 DIAGNOSIS — K21.9 GASTROESOPHAGEAL REFLUX DISEASE WITHOUT ESOPHAGITIS: ICD-10-CM

## 2024-10-22 DIAGNOSIS — R26.2 AMBULATORY DYSFUNCTION: ICD-10-CM

## 2024-10-22 DIAGNOSIS — H01.016 ULCERATIVE BLEPHARITIS OF BOTH EYES, UNSPECIFIED EYELID: ICD-10-CM

## 2024-10-22 PROCEDURE — G0008 ADMIN INFLUENZA VIRUS VAC: HCPCS

## 2024-10-22 PROCEDURE — 90662 IIV NO PRSV INCREASED AG IM: CPT

## 2024-10-22 PROCEDURE — 99214 OFFICE O/P EST MOD 30 MIN: CPT | Performed by: INTERNAL MEDICINE

## 2024-10-22 NOTE — PROGRESS NOTES
Assessment/Plan:           1. Late onset Alzheimer's dementia without behavioral disturbance (HCC)  Comments:  Patient is living in assisted living with medications being administered.  Continue current treatment  Orders:  -     CBC and differential; Future  -     Comprehensive metabolic panel; Future  -     Urinalysis with microscopic; Future  2. Encounter for immunization  -     influenza vaccine, high-dose, PF 0.5 mL (Fluzone High Dose)  3. Ulcerative blepharitis of both eyes, unspecified eyelid  4. Gastroesophageal reflux disease without esophagitis  Comments:  Continue famotidine as needed.  5. Ambulatory dysfunction  -     Ambulatory Referral to Physical Therapy; Future  6. Bilateral impacted cerumen  Comments:  Debrox drops advised.  Orders:  -     carbamide peroxide (DEBROX) 6.5 % otic solution; Administer 5 drops into both ears daily at bedtime for 7 days         1. Late onset Alzheimer's dementia without behavioral disturbance (HCC)      2. Encounter for immunization    - influenza vaccine, high-dose, PF 0.5 mL (Fluzone High Dose)    3. Ulcerative blepharitis of both eyes, unspecified eyelid      4. Gastroesophageal reflux disease without esophagitis         No problem-specific Assessment & Plan notes found for this encounter.           Subjective:      Patient ID: Tiffany Brizuela is a 84 y.o. female.    HPI    The following portions of the patient's history were reviewed and updated as appropriate: She  has a past medical history of Allergic (1950), Esophageal dilatation, Gastroesophageal reflux disease, GERD (gastroesophageal reflux disease) (20157), Hypertension, essential, Memory loss (2021), Other abnormal and inconclusive findings on diagnostic imaging of breast, Other rosacea, Palmar fascial fibromatosis, and Vitamin D deficiency.  She   Patient Active Problem List    Diagnosis Date Noted    Word finding difficulty 04/26/2024    Hypertension, essential     Nasal congestion 07/13/2022    Mild  protein-calorie malnutrition (HCC) 03/08/2022    Late onset Alzheimer disease (HCC) 11/23/2021    Gastroesophageal reflux disease without esophagitis 09/14/2020    Vitamin D deficiency 09/14/2020    Vitamin B12 deficiency 09/14/2020    Allergies 09/14/2020     She  has a past surgical history that includes Tubal ligation (1976) and Colonoscopy (12/21/2011).  Her family history includes Colon cancer (age of onset: 60) in her paternal grandfather; Hypertension in her family; No Known Problems in her maternal aunt, maternal grandfather, maternal grandmother, paternal aunt, paternal aunt, paternal aunt, paternal aunt, and paternal grandmother; Prostate cancer in her family; Prostate cancer (age of onset: 72) in her brother; Stroke in her father and mother.  She  reports that she has never smoked. She has never used smokeless tobacco. She reports that she does not currently use alcohol after a past usage of about 1.0 standard drink of alcohol per week. She reports that she does not use drugs.  Current Outpatient Medications   Medication Sig Dispense Refill    b complex vitamins capsule Take 1 capsule by mouth daily 30 capsule 0    carbamide peroxide (DEBROX) 6.5 % otic solution Administer 5 drops into both ears daily at bedtime for 7 days 15 mL 0    cholecalciferol (VITAMIN D3) 1,000 units tablet Take 2 tablets (2,000 Units total) by mouth daily 30 tablet 0    donepezil (ARICEPT ODT) 5 MG disintegrating tablet Take 1 tablet (5 mg total) by mouth daily at bedtime 90 tablet 1    erythromycin (ILOTYCIN) ophthalmic ointment Administer 0.5 inches to the right eye every 12 (twelve) hours 3.5 g 3    famotidine (PEPCID) 10 mg tablet Take 1 tablet (10 mg total) by mouth 2 (two) times a day as needed for heartburn 60 tablet 0    fluticasone (FLONASE) 50 mcg/act nasal spray 1 spray into each nostril daily 18.2 mL 1    vitamin B-12 (CYANOCOBALAMIN) 250 MCG tablet Take 1 tablet (250 mcg total) by mouth daily 30 tablet 0     Cholecalciferol (VITAMIN D-3 PO)  (Patient not taking: Reported on 7/2/2024)       No current facility-administered medications for this visit.     Current Outpatient Medications on File Prior to Visit   Medication Sig    b complex vitamins capsule Take 1 capsule by mouth daily    cholecalciferol (VITAMIN D3) 1,000 units tablet Take 2 tablets (2,000 Units total) by mouth daily    donepezil (ARICEPT ODT) 5 MG disintegrating tablet Take 1 tablet (5 mg total) by mouth daily at bedtime    erythromycin (ILOTYCIN) ophthalmic ointment Administer 0.5 inches to the right eye every 12 (twelve) hours    famotidine (PEPCID) 10 mg tablet Take 1 tablet (10 mg total) by mouth 2 (two) times a day as needed for heartburn    fluticasone (FLONASE) 50 mcg/act nasal spray 1 spray into each nostril daily    vitamin B-12 (CYANOCOBALAMIN) 250 MCG tablet Take 1 tablet (250 mcg total) by mouth daily    Cholecalciferol (VITAMIN D-3 PO)  (Patient not taking: Reported on 7/2/2024)     No current facility-administered medications on file prior to visit.     There are no discontinued medications.   She is allergic to dust mite extract, aspirin, bactrim [sulfamethoxazole-trimethoprim], pollen extract, and proton pump inhibitors..    Review of Systems   Constitutional:  Negative for appetite change, chills, fatigue and fever.   HENT:  Negative for sore throat and trouble swallowing.    Eyes:  Negative for redness.   Respiratory:  Negative for shortness of breath.    Cardiovascular:  Negative for chest pain and palpitations.   Gastrointestinal:  Negative for abdominal pain, constipation and diarrhea.   Genitourinary:  Negative for dysuria and hematuria.   Musculoskeletal:  Negative for back pain and neck pain.   Skin:  Negative for rash.   Neurological:  Negative for seizures, weakness and headaches.   Hematological:  Negative for adenopathy.   Psychiatric/Behavioral:  Negative for confusion. The patient is not nervous/anxious.       "    Objective:      /74 (BP Location: Left arm, Patient Position: Sitting, Cuff Size: Standard)   Pulse 78   Temp 99.4 °F (37.4 °C) (Temporal)   Ht 5' 3\" (1.6 m)   Wt 50.8 kg (112 lb)   SpO2 96%   BMI 19.84 kg/m²     Results Reviewed       None            No results found for this or any previous visit (from the past 1344 hour(s)).     Physical Exam  Constitutional:       General: She is not in acute distress.     Appearance: Normal appearance.   HENT:      Head: Normocephalic and atraumatic.      Right Ear: There is impacted cerumen.      Left Ear: There is impacted cerumen.      Nose: Nose normal.      Mouth/Throat:      Mouth: Mucous membranes are moist.   Eyes:      Extraocular Movements: Extraocular movements intact.      Pupils: Pupils are equal, round, and reactive to light.      Comments: blepharitis   Cardiovascular:      Rate and Rhythm: Normal rate and regular rhythm.      Pulses: Normal pulses.      Heart sounds: Normal heart sounds. No murmur heard.     No friction rub.   Pulmonary:      Effort: Pulmonary effort is normal. No respiratory distress.      Breath sounds: Normal breath sounds. No wheezing.   Abdominal:      General: Abdomen is flat. Bowel sounds are normal. There is no distension.      Palpations: Abdomen is soft. There is no mass.      Tenderness: There is no abdominal tenderness. There is no guarding.   Musculoskeletal:         General: Normal range of motion.      Cervical back: Neck supple.   Neurological:      General: No focal deficit present.      Mental Status: She is alert. Mental status is at baseline.      Cranial Nerves: No cranial nerve deficit.      Gait: Gait abnormal.      Comments: Short-term memory loss is present.   Psychiatric:         Mood and Affect: Mood normal.         Behavior: Behavior normal.         "

## 2024-11-15 DIAGNOSIS — F02.80 LATE ONSET ALZHEIMER'S DEMENTIA WITHOUT BEHAVIORAL DISTURBANCE (HCC): ICD-10-CM

## 2024-11-15 DIAGNOSIS — G30.1 LATE ONSET ALZHEIMER'S DEMENTIA WITHOUT BEHAVIORAL DISTURBANCE (HCC): ICD-10-CM

## 2024-11-15 DIAGNOSIS — R09.81 NASAL CONGESTION: ICD-10-CM

## 2024-11-15 DIAGNOSIS — H01.016 ULCERATIVE BLEPHARITIS OF BOTH EYES, UNSPECIFIED EYELID: ICD-10-CM

## 2024-11-15 DIAGNOSIS — K21.9 GASTROESOPHAGEAL REFLUX DISEASE WITHOUT ESOPHAGITIS: ICD-10-CM

## 2024-11-15 DIAGNOSIS — H01.013 ULCERATIVE BLEPHARITIS OF BOTH EYES, UNSPECIFIED EYELID: ICD-10-CM

## 2024-11-15 RX ORDER — CHOLECALCIFEROL (VITAMIN D3) 25 MCG
2000 TABLET ORAL DAILY
Qty: 30 TABLET | Refills: 3 | Status: SHIPPED | OUTPATIENT
Start: 2024-11-15

## 2024-11-15 RX ORDER — ERYTHROMYCIN 5 MG/G
0.5 OINTMENT OPHTHALMIC EVERY 12 HOURS SCHEDULED
Qty: 4 G | Refills: 3 | Status: SHIPPED | OUTPATIENT
Start: 2024-11-15

## 2024-11-15 RX ORDER — FLUTICASONE PROPIONATE 50 MCG
1 SPRAY, SUSPENSION (ML) NASAL DAILY
Qty: 18 G | Refills: 3 | Status: SHIPPED | OUTPATIENT
Start: 2024-11-15

## 2024-11-15 RX ORDER — CALCIUM CARBONATE/VITAMIN D3 600 MG-10
250 TABLET ORAL DAILY
Qty: 30 TABLET | Refills: 3 | Status: SHIPPED | OUTPATIENT
Start: 2024-11-15

## 2024-11-15 RX ORDER — VITAMIN B COMPLEX
1 CAPSULE ORAL DAILY
Qty: 30 CAPSULE | Refills: 3 | Status: SHIPPED | OUTPATIENT
Start: 2024-11-15

## 2024-11-15 RX ORDER — FAMOTIDINE 10 MG
10 TABLET ORAL 2 TIMES DAILY PRN
Qty: 180 TABLET | Refills: 3 | Status: SHIPPED | OUTPATIENT
Start: 2024-11-15 | End: 2025-02-13

## 2024-11-15 RX ORDER — DONEPEZIL HYDROCHLORIDE 5 MG/1
5 TABLET, ORALLY DISINTEGRATING ORAL
Qty: 90 TABLET | Refills: 3 | Status: SHIPPED | OUTPATIENT
Start: 2024-11-15 | End: 2025-11-10

## 2025-01-20 DIAGNOSIS — K21.9 GASTROESOPHAGEAL REFLUX DISEASE WITHOUT ESOPHAGITIS: ICD-10-CM

## 2025-01-21 RX ORDER — CALCIUM CARBONATE/VITAMIN D3 600 MG-10
250 TABLET ORAL EVERY MORNING
Qty: 30 TABLET | Refills: 4 | Status: SHIPPED | OUTPATIENT
Start: 2025-01-21

## 2025-01-21 RX ORDER — CHOLECALCIFEROL (VITAMIN D3) 25 MCG
TABLET ORAL
Qty: 60 TABLET | Refills: 5 | Status: SHIPPED | OUTPATIENT
Start: 2025-01-21

## 2025-01-21 RX ORDER — VITAMIN B COMPLEX
1 CAPSULE ORAL EVERY MORNING
Qty: 30 CAPSULE | Refills: 4 | Status: SHIPPED | OUTPATIENT
Start: 2025-01-21

## 2025-01-23 ENCOUNTER — TELEPHONE (OUTPATIENT)
Dept: INTERNAL MEDICINE CLINIC | Facility: CLINIC | Age: 85
End: 2025-01-23

## 2025-01-23 NOTE — TELEPHONE ENCOUNTER
Pt son Vernon called to have office call [facility], at [phone] the facility she lives in, and coordinate the appt with them. Also coordinate medications called into [facility]

## 2025-03-11 ENCOUNTER — TELEPHONE (OUTPATIENT)
Dept: OTHER | Facility: OTHER | Age: 85
End: 2025-03-11

## 2025-03-12 ENCOUNTER — TELEPHONE (OUTPATIENT)
Dept: INTERNAL MEDICINE CLINIC | Facility: OTHER | Age: 85
End: 2025-03-12

## 2025-03-12 NOTE — TELEPHONE ENCOUNTER
Marni SAAB from [facility] is calling to report that patient  fell and hit her head and  had to sent out in the ambulance due to uncontrolled bleeding. Patient was sent to [facility] . Per RN call can wait until office opens in the morning she is just calling to notify office. Please follow up. Thank you in advance.

## 2025-03-13 NOTE — TELEPHONE ENCOUNTER
Spoke to facility was transferred to [place] the care director, left message on her vm to call back to schedule f/u from ER appt

## 2025-03-13 NOTE — TELEPHONE ENCOUNTER
Schedule ED follow up  Pt resides at [facility].  They will arrange transportation

## 2025-03-14 ENCOUNTER — PATIENT MESSAGE (OUTPATIENT)
Dept: INTERNAL MEDICINE CLINIC | Facility: CLINIC | Age: 85
End: 2025-03-14

## 2025-03-14 ENCOUNTER — RA CDI HCC (OUTPATIENT)
Dept: OTHER | Facility: HOSPITAL | Age: 85
End: 2025-03-14

## 2025-03-17 DIAGNOSIS — H01.016 ULCERATIVE BLEPHARITIS OF BOTH EYES, UNSPECIFIED EYELID: ICD-10-CM

## 2025-03-17 DIAGNOSIS — H01.013 ULCERATIVE BLEPHARITIS OF BOTH EYES, UNSPECIFIED EYELID: ICD-10-CM

## 2025-03-18 RX ORDER — ERYTHROMYCIN 5 MG/G
OINTMENT OPHTHALMIC
Qty: 3.5 G | Refills: 10 | Status: SHIPPED | OUTPATIENT
Start: 2025-03-18

## 2025-03-19 ENCOUNTER — TELEPHONE (OUTPATIENT)
Dept: INTERNAL MEDICINE CLINIC | Facility: CLINIC | Age: 85
End: 2025-03-19

## 2025-03-19 NOTE — TELEPHONE ENCOUNTER
.received paper  work on patient  .patients son needs to  sigh paper work first [place] so called son to have him fax over new paper work that is sighed by him

## 2025-03-19 NOTE — TELEPHONE ENCOUNTER
Patient's son called stating he completed paperwork online and should now be in patient chart.  Any questions call paul Shay.

## 2025-03-20 ENCOUNTER — OFFICE VISIT (OUTPATIENT)
Dept: INTERNAL MEDICINE CLINIC | Facility: CLINIC | Age: 85
End: 2025-03-20
Payer: MEDICARE

## 2025-03-20 VITALS
SYSTOLIC BLOOD PRESSURE: 138 MMHG | WEIGHT: 103.6 LBS | DIASTOLIC BLOOD PRESSURE: 80 MMHG | HEART RATE: 64 BPM | HEIGHT: 63 IN | OXYGEN SATURATION: 95 % | TEMPERATURE: 98 F | BODY MASS INDEX: 18.36 KG/M2

## 2025-03-20 DIAGNOSIS — R26.2 AMBULATORY DYSFUNCTION: ICD-10-CM

## 2025-03-20 DIAGNOSIS — H01.013 ULCERATIVE BLEPHARITIS OF BOTH EYES, UNSPECIFIED EYELID: ICD-10-CM

## 2025-03-20 DIAGNOSIS — M85.852 OSTEOPENIA OF NECK OF LEFT FEMUR: ICD-10-CM

## 2025-03-20 DIAGNOSIS — H61.23 BILATERAL IMPACTED CERUMEN: ICD-10-CM

## 2025-03-20 DIAGNOSIS — F02.80 LATE ONSET ALZHEIMER'S DEMENTIA WITHOUT BEHAVIORAL DISTURBANCE (HCC): Primary | ICD-10-CM

## 2025-03-20 DIAGNOSIS — H01.016 ULCERATIVE BLEPHARITIS OF BOTH EYES, UNSPECIFIED EYELID: ICD-10-CM

## 2025-03-20 DIAGNOSIS — G30.1 LATE ONSET ALZHEIMER'S DEMENTIA WITHOUT BEHAVIORAL DISTURBANCE (HCC): Primary | ICD-10-CM

## 2025-03-20 DIAGNOSIS — K21.9 GASTROESOPHAGEAL REFLUX DISEASE WITHOUT ESOPHAGITIS: ICD-10-CM

## 2025-03-20 PROCEDURE — G2211 COMPLEX E/M VISIT ADD ON: HCPCS | Performed by: INTERNAL MEDICINE

## 2025-03-20 PROCEDURE — 99214 OFFICE O/P EST MOD 30 MIN: CPT | Performed by: INTERNAL MEDICINE

## 2025-03-20 RX ORDER — LOPERAMIDE HYDROCHLORIDE 2 MG/1
2 CAPSULE ORAL AS NEEDED
COMMUNITY

## 2025-03-20 NOTE — PROGRESS NOTES
Assessment/Plan:           1. Late onset Alzheimer's dementia without behavioral disturbance (HCC)  Comments:  Forms were filled out for her transfer to a new facility.  Continue current care.  2. Bilateral impacted cerumen  Comments:  Advised to follow-up with ENT.  3. Gastroesophageal reflux disease without esophagitis  Comments:  Continue famotidine.  4. Ulcerative blepharitis of both eyes, unspecified eyelid  5. Ambulatory dysfunction  Comments:  Patient remains at high fall risk.  6. Osteopenia of neck of left femur         There are no diagnoses linked to this encounter.     No problem-specific Assessment & Plan notes found for this encounter.           Subjective:      Patient ID: Tiffany Brizuela is a 84 y.o. female.    HPI    The following portions of the patient's history were reviewed and updated as appropriate: She  has a past medical history of Allergic (1950), Esophageal dilatation, Gastroesophageal reflux disease, GERD (gastroesophageal reflux disease) (20157), Hypertension, essential, Memory loss (2021), Other abnormal and inconclusive findings on diagnostic imaging of breast, Other rosacea, Palmar fascial fibromatosis, and Vitamin D deficiency.  She   Patient Active Problem List    Diagnosis Date Noted    Word finding difficulty 04/26/2024    Hypertension, essential     Nasal congestion 07/13/2022    Mild protein-calorie malnutrition (HCC) 03/08/2022    Late onset Alzheimer disease (HCC) 11/23/2021    Gastroesophageal reflux disease without esophagitis 09/14/2020    Vitamin D deficiency 09/14/2020    Vitamin B12 deficiency 09/14/2020    Allergies 09/14/2020     She  has a past surgical history that includes Tubal ligation (1976) and Colonoscopy (12/21/2011).  Her family history includes Colon cancer (age of onset: 60) in her paternal grandfather; Hypertension in her family; No Known Problems in her maternal aunt, maternal grandfather, maternal grandmother, paternal aunt, paternal aunt, paternal  aunt, paternal aunt, and paternal grandmother; Prostate cancer in her family; Prostate cancer (age of onset: 72) in her brother; Stroke in her father and mother.  She  reports that she has never smoked. She has never used smokeless tobacco. She reports that she does not currently use alcohol after a past usage of about 1.0 standard drink of alcohol per week. She reports that she does not use drugs.  Current Outpatient Medications   Medication Sig Dispense Refill    b complex vitamins capsule 1 CAP ORALLY EVERY MORNING DX: SUPPLEMENT 30 capsule 4    cholecalciferol (VITAMIN D3) 1,000 units tablet 2 TABS (2000IU) ORALLYEVERY MORNING DX: SUPPLEMENT 60 tablet 5    donepezil (ARICEPT ODT) 5 MG disintegrating tablet Take 1 tablet (5 mg total) by mouth daily at bedtime 90 tablet 3    erythromycin (ILOTYCIN) ophthalmic ointment APPLY 0.5 INCH RIBBON TO RIGHT EYE EVERY 12 HOURS AT 8AM AND 8PM DX: PROPHYLAXIS MEASURE 3.5 g 10    famotidine (PEPCID) 10 mg tablet Take 1 tablet (10 mg total) by mouth 2 (two) times a day as needed for heartburn 180 tablet 3    fluticasone (FLONASE) 50 mcg/act nasal spray 1 spray into each nostril daily 18 g 3    loperamide (IMODIUM) 2 mg capsule Take 2 mg by mouth as needed for diarrhea For loose stools not to exceed 8 caps/24 hours      Minoxidil 5 % FOAM Apply 1 mL topically daily at bedtime Apply to scalp      vitamin B-12 (CYANOCOBALAMIN) 250 MCG tablet 1 TAB ORALLY EVERY MORNING DX: SUPPLEMENT 30 tablet 4     No current facility-administered medications for this visit.     Current Outpatient Medications on File Prior to Visit   Medication Sig    b complex vitamins capsule 1 CAP ORALLY EVERY MORNING DX: SUPPLEMENT    cholecalciferol (VITAMIN D3) 1,000 units tablet 2 TABS (2000IU) ORALLYEVERY MORNING DX: SUPPLEMENT    donepezil (ARICEPT ODT) 5 MG disintegrating tablet Take 1 tablet (5 mg total) by mouth daily at bedtime    erythromycin (ILOTYCIN) ophthalmic ointment APPLY 0.5 INCH RIBBON TO  RIGHT EYE EVERY 12 HOURS AT 8AM AND 8PM DX: PROPHYLAXIS MEASURE    famotidine (PEPCID) 10 mg tablet Take 1 tablet (10 mg total) by mouth 2 (two) times a day as needed for heartburn    fluticasone (FLONASE) 50 mcg/act nasal spray 1 spray into each nostril daily    loperamide (IMODIUM) 2 mg capsule Take 2 mg by mouth as needed for diarrhea For loose stools not to exceed 8 caps/24 hours    Minoxidil 5 % FOAM Apply 1 mL topically daily at bedtime Apply to scalp    vitamin B-12 (CYANOCOBALAMIN) 250 MCG tablet 1 TAB ORALLY EVERY MORNING DX: SUPPLEMENT    [DISCONTINUED] carbamide peroxide (DEBROX) 6.5 % otic solution Administer 5 drops into both ears daily at bedtime for 7 days (Patient not taking: Reported on 3/20/2025)    [DISCONTINUED] Cholecalciferol (VITAMIN D-3 PO)  (Patient not taking: Reported on 7/2/2024)     No current facility-administered medications on file prior to visit.     Medications Discontinued During This Encounter   Medication Reason    Cholecalciferol (VITAMIN D-3 PO) Alternate therapy    carbamide peroxide (DEBROX) 6.5 % otic solution       She is allergic to dust mite extract, aspirin, bactrim [sulfamethoxazole-trimethoprim], pollen extract, and proton pump inhibitors..    Review of Systems   Constitutional:  Negative for appetite change, chills, fatigue and fever.   HENT:  Negative for sore throat and trouble swallowing.    Eyes:  Negative for redness.   Respiratory:  Negative for shortness of breath.    Cardiovascular:  Negative for chest pain and palpitations.   Gastrointestinal:  Negative for abdominal pain, constipation and diarrhea.   Genitourinary:  Negative for dysuria and hematuria.   Musculoskeletal:  Positive for gait problem. Negative for back pain and neck pain.   Skin:  Positive for wound. Negative for rash.   Neurological:  Negative for seizures, weakness and headaches.        Cognitive decline present   Hematological:  Negative for adenopathy.   Psychiatric/Behavioral:  Negative for  "confusion. The patient is not nervous/anxious.          Objective:      /80 (BP Location: Left arm, Patient Position: Sitting, Cuff Size: Adult)   Pulse 64   Temp 98 °F (36.7 °C) (Oral)   Ht 5' 3\" (1.6 m)   Wt 47 kg (103 lb 9.6 oz)   SpO2 95% Comment: ra  BMI 18.35 kg/m²     Results Reviewed       None            No results found for this or any previous visit (from the past 8 weeks).     Physical Exam  Constitutional:       General: She is not in acute distress.     Appearance: Normal appearance.   HENT:      Head: Normocephalic and atraumatic.      Ears:      Comments: Cerumen present bilaterally     Nose: Nose normal.      Mouth/Throat:      Mouth: Mucous membranes are moist.   Eyes:      Extraocular Movements: Extraocular movements intact.      Pupils: Pupils are equal, round, and reactive to light.   Cardiovascular:      Rate and Rhythm: Normal rate and regular rhythm.      Pulses: Normal pulses.      Heart sounds: Normal heart sounds. No murmur heard.     No friction rub.   Pulmonary:      Effort: Pulmonary effort is normal. No respiratory distress.      Breath sounds: Normal breath sounds. No wheezing.   Abdominal:      General: Abdomen is flat. Bowel sounds are normal. There is no distension.      Palpations: Abdomen is soft. There is no mass.      Tenderness: There is no abdominal tenderness. There is no guarding.   Musculoskeletal:         General: Normal range of motion.      Cervical back: Neck supple.   Skin:     General: Skin is warm.      Comments: Staple was removed from scalp.   Neurological:      Mental Status: She is alert. Mental status is at baseline.      Cranial Nerves: No cranial nerve deficit.      Gait: Gait abnormal.      Comments: Cognitive decline present.   Psychiatric:         Mood and Affect: Mood normal.         Behavior: Behavior normal.      Staple was removed from scalp.  Antibiotic ointment applied.  "

## 2025-04-14 ENCOUNTER — TELEPHONE (OUTPATIENT)
Age: 85
End: 2025-04-14

## 2025-04-14 NOTE — TELEPHONE ENCOUNTER
[facility] need a list of medication the patient is on fax to them. The patient is moving to a facility in [place]. Please have the listing fax to [phone]. Thank You

## (undated) RX ORDER — ERYTHROMYCIN 5 MG/G: 1/4 OINTMENT OPHTHALMIC TWICE A DAY